# Patient Record
Sex: FEMALE | Race: WHITE | NOT HISPANIC OR LATINO | Employment: OTHER | URBAN - NONMETROPOLITAN AREA
[De-identification: names, ages, dates, MRNs, and addresses within clinical notes are randomized per-mention and may not be internally consistent; named-entity substitution may affect disease eponyms.]

---

## 2018-02-12 ENCOUNTER — HISTORICAL (OUTPATIENT)
Dept: ADMINISTRATIVE | Facility: HOSPITAL | Age: 73
End: 2018-02-12

## 2018-12-21 ENCOUNTER — HISTORICAL (OUTPATIENT)
Dept: ADMINISTRATIVE | Facility: HOSPITAL | Age: 73
End: 2018-12-21

## 2019-09-11 ENCOUNTER — HISTORICAL (OUTPATIENT)
Dept: ADMINISTRATIVE | Facility: HOSPITAL | Age: 74
End: 2019-09-11

## 2019-12-23 ENCOUNTER — HISTORICAL (OUTPATIENT)
Dept: ADMINISTRATIVE | Facility: HOSPITAL | Age: 74
End: 2019-12-23

## 2021-03-30 ENCOUNTER — HISTORICAL (OUTPATIENT)
Dept: ADMINISTRATIVE | Facility: HOSPITAL | Age: 76
End: 2021-03-30

## 2022-03-31 ENCOUNTER — HISTORICAL (OUTPATIENT)
Dept: ADMINISTRATIVE | Facility: HOSPITAL | Age: 77
End: 2022-03-31

## 2022-04-11 ENCOUNTER — HISTORICAL (OUTPATIENT)
Dept: ADMINISTRATIVE | Facility: HOSPITAL | Age: 77
End: 2022-04-11

## 2022-04-24 VITALS
SYSTOLIC BLOOD PRESSURE: 148 MMHG | WEIGHT: 145.31 LBS | BODY MASS INDEX: 28.53 KG/M2 | HEIGHT: 60 IN | DIASTOLIC BLOOD PRESSURE: 80 MMHG

## 2022-05-14 ENCOUNTER — HISTORICAL (OUTPATIENT)
Dept: ADMINISTRATIVE | Facility: HOSPITAL | Age: 77
End: 2022-05-14

## 2023-01-07 ENCOUNTER — HISTORICAL (OUTPATIENT)
Dept: ADMINISTRATIVE | Facility: HOSPITAL | Age: 78
End: 2023-01-07
Payer: MEDICARE

## 2023-03-16 DIAGNOSIS — Z12.31 BREAST CANCER SCREENING BY MAMMOGRAM: Primary | ICD-10-CM

## 2023-04-01 ENCOUNTER — HOSPITAL ENCOUNTER (EMERGENCY)
Facility: HOSPITAL | Age: 78
Discharge: HOME | End: 2023-04-01
Attending: FAMILY MEDICINE
Payer: MEDICARE

## 2023-04-01 VITALS
RESPIRATION RATE: 18 BRPM | DIASTOLIC BLOOD PRESSURE: 54 MMHG | SYSTOLIC BLOOD PRESSURE: 167 MMHG | BODY MASS INDEX: 25.52 KG/M2 | WEIGHT: 130 LBS | TEMPERATURE: 98 F | HEART RATE: 86 BPM | OXYGEN SATURATION: 98 % | HEIGHT: 60 IN

## 2023-04-01 DIAGNOSIS — T78.3XXA ANGIOEDEMA, INITIAL ENCOUNTER: ICD-10-CM

## 2023-04-01 DIAGNOSIS — T78.3XXA IDIOPATHIC ANGIOEDEMA, INITIAL ENCOUNTER: Primary | ICD-10-CM

## 2023-04-01 PROCEDURE — 96372 THER/PROPH/DIAG INJ SC/IM: CPT | Performed by: FAMILY MEDICINE

## 2023-04-01 PROCEDURE — 99284 EMERGENCY DEPT VISIT MOD MDM: CPT

## 2023-04-01 PROCEDURE — 63600175 PHARM REV CODE 636 W HCPCS: Performed by: FAMILY MEDICINE

## 2023-04-01 RX ORDER — METHYLPREDNISOLONE SOD SUCC 125 MG
125 VIAL (EA) INJECTION
Status: COMPLETED | OUTPATIENT
Start: 2023-04-01 | End: 2023-04-01

## 2023-04-01 RX ORDER — METHYLPREDNISOLONE 4 MG/1
TABLET ORAL
Qty: 21 EACH | Refills: 0 | Status: SHIPPED | OUTPATIENT
Start: 2023-04-01 | End: 2023-04-01 | Stop reason: SDUPTHER

## 2023-04-01 RX ORDER — METHYLPREDNISOLONE SOD SUCC 125 MG
125 VIAL (EA) INJECTION
Status: DISCONTINUED | OUTPATIENT
Start: 2023-04-01 | End: 2023-04-01

## 2023-04-01 RX ORDER — CETIRIZINE HYDROCHLORIDE 10 MG/1
10 TABLET ORAL DAILY
Qty: 30 TABLET | Refills: 0 | Status: SHIPPED | OUTPATIENT
Start: 2023-04-01 | End: 2023-05-01

## 2023-04-01 RX ORDER — CETIRIZINE HYDROCHLORIDE 10 MG/1
10 TABLET ORAL DAILY
Qty: 30 TABLET | Refills: 0 | Status: SHIPPED | OUTPATIENT
Start: 2023-04-01 | End: 2023-04-01 | Stop reason: SDUPTHER

## 2023-04-01 RX ORDER — METHYLPREDNISOLONE 4 MG/1
TABLET ORAL
Qty: 21 EACH | Refills: 0 | Status: SHIPPED | OUTPATIENT
Start: 2023-04-01 | End: 2023-04-22

## 2023-04-01 RX ADMIN — METHYLPREDNISOLONE SODIUM SUCCINATE 125 MG: 125 INJECTION, POWDER, FOR SOLUTION INTRAMUSCULAR; INTRAVENOUS at 08:04

## 2023-04-01 NOTE — ED PROVIDER NOTES
Encounter Date: 4/1/2023       History     Chief Complaint   Patient presents with    Nausea    Oral Swelling     Reports waking up at 0230 this morning with some swelling to tongue and nausea. Denies sob or diff. Breathing, also denies eating anything new or changes in meds.     Patient says that she has been having swelling of the tongue and which has gone down by itself I see a glossy tongue with no markers of infection or inflammation the patient is not on angiotensin receptor blockers the patient denies any unusual food she has consumed she denies any insect stings suspect allergic response versus idiopathic angioedema no facial muscle paralysis no tongue deviations language intact no motor or sensory deficits motor strength 5 x 5 in all 4 extremities normal gaze          Review of patient's allergies indicates:   Allergen Reactions    Keflex [cephalexin]     Sulfa (sulfonamide antibiotics)      Past Medical History:   Diagnosis Date    Diabetes mellitus     Hypertension      Past Surgical History:   Procedure Laterality Date    APPENDECTOMY      CHOLECYSTECTOMY      COLON SURGERY      HERNIA REPAIR       History reviewed. No pertinent family history.     Review of Systems   Constitutional:  Negative for fever.   HENT:  Negative for sore throat.         Tongue swelling   Eyes:  Negative for photophobia, pain, discharge, redness and itching.   Respiratory:  Negative for shortness of breath.    Cardiovascular:  Negative for chest pain.   Gastrointestinal:  Negative for nausea.   Endocrine: Negative for cold intolerance, heat intolerance and polydipsia.   Genitourinary:  Negative for decreased urine volume, difficulty urinating, dyspareunia, dysuria, enuresis, hematuria and pelvic pain.   Musculoskeletal:  Negative for back pain.   Skin:  Negative for rash.   Allergic/Immunologic: Negative for environmental allergies and food allergies.   Neurological:  Negative for dizziness, tremors, seizures, facial asymmetry,  speech difficulty, weakness, light-headedness, numbness and headaches.   Hematological:  Does not bruise/bleed easily.   Psychiatric/Behavioral:  Negative for agitation, behavioral problems and confusion.      Physical Exam     Initial Vitals [04/01/23 0750]   BP Pulse Resp Temp SpO2   (!) 167/54 86 18 97.7 °F (36.5 °C) 98 %      MAP       --         Physical Exam    Nursing note and vitals reviewed.  Constitutional: She appears well-developed.   HENT:   Tongue swelling which has gone down significantly on its own at the time of exam the texture of the tongue on the lateral aspect was a little rough but no obvious swelling   Eyes: Pupils are equal, round, and reactive to light.   Neck:   Normal range of motion.  Cardiovascular:  Normal rate, regular rhythm, normal heart sounds and intact distal pulses.           Pulmonary/Chest: Breath sounds normal.   Musculoskeletal:         General: Normal range of motion.      Cervical back: Normal range of motion.     Skin: Skin is warm.   Psychiatric: She has a normal mood and affect.       ED Course   Procedures  Labs Reviewed - No data to display       Imaging Results    None          Medications   methylPREDNISolone sodium succinate injection 125 mg (has no administration in time range)                              Clinical Impression:   Final diagnoses:  [T78.3XXA] Idiopathic angioedema, initial encounter (Primary)  [T78.3XXA] Angioedema, initial encounter        ED Disposition Condition    Discharge Stable          ED Prescriptions       Medication Sig Dispense Start Date End Date Auth. Provider    cetirizine (ZYRTEC) 10 MG tablet Take 1 tablet (10 mg total) by mouth once daily. 30 tablet 4/1/2023 5/1/2023 De Corley MD    methylPREDNISolone (MEDROL DOSEPACK) 4 mg tablet use as directed 21 each 4/1/2023 4/22/2023 De Corley MD          Follow-up Information       Follow up With Specialties Details Why Contact Info    lSava Carty III, MD Family Medicine In  1 day  [address] [place] [address]  [phone]               De Corley MD  04/01/23 0829       De Corley MD  04/01/23 0832

## 2023-04-03 ENCOUNTER — HOSPITAL ENCOUNTER (OUTPATIENT)
Dept: RADIOLOGY | Facility: HOSPITAL | Age: 78
Discharge: HOME | End: 2023-04-03
Attending: NURSE PRACTITIONER
Payer: MEDICARE

## 2023-04-03 VITALS — WEIGHT: 130 LBS | BODY MASS INDEX: 24.55 KG/M2 | HEIGHT: 61 IN

## 2023-04-03 DIAGNOSIS — Z12.31 BREAST CANCER SCREENING BY MAMMOGRAM: ICD-10-CM

## 2023-04-03 PROCEDURE — 77067 SCR MAMMO BI INCL CAD: CPT | Mod: TC

## 2023-04-18 ENCOUNTER — HOSPITAL ENCOUNTER (INPATIENT)
Facility: HOSPITAL | Age: 78
LOS: 2 days | Discharge: HOME | DRG: 392 | End: 2023-04-21
Attending: FAMILY MEDICINE | Admitting: INTERNAL MEDICINE
Payer: MEDICARE

## 2023-04-18 DIAGNOSIS — A41.9 SEVERE SEPSIS: Primary | ICD-10-CM

## 2023-04-18 DIAGNOSIS — R55 SYNCOPE: ICD-10-CM

## 2023-04-18 DIAGNOSIS — R05.9 COUGH: ICD-10-CM

## 2023-04-18 DIAGNOSIS — R65.20 SEVERE SEPSIS: Primary | ICD-10-CM

## 2023-04-18 LAB
ALBUMIN SERPL-MCNC: 3.7 G/DL (ref 3.4–5)
ALBUMIN/GLOB SERPL: 1.3 RATIO
ALP SERPL-CCNC: 103 UNIT/L (ref 50–144)
ALT SERPL-CCNC: 26 UNIT/L (ref 1–45)
ANION GAP SERPL CALC-SCNC: 10 MEQ/L (ref 2–13)
APPEARANCE UR: CLEAR
AST SERPL-CCNC: 35 UNIT/L (ref 14–36)
BACTERIA #/AREA URNS AUTO: NORMAL /HPF
BILIRUB UR QL STRIP.AUTO: ABNORMAL MG/DL
BILIRUBIN DIRECT+TOT PNL SERPL-MCNC: 0.8 MG/DL (ref 0–1)
BUN SERPL-MCNC: 12 MG/DL (ref 7–20)
CALCIUM SERPL-MCNC: 9.3 MG/DL (ref 8.4–10.2)
CHLORIDE SERPL-SCNC: 97 MMOL/L (ref 98–110)
CK SERPL-CCNC: 45 U/L (ref 30–135)
CO2 SERPL-SCNC: 19 MMOL/L (ref 21–32)
COLOR UR AUTO: YELLOW
CREAT SERPL-MCNC: 0.83 MG/DL (ref 0.66–1.25)
CREAT/UREA NIT SERPL: 14 (ref 12–20)
ERYTHROCYTE [DISTWIDTH] IN BLOOD BY AUTOMATED COUNT: 13.2 % (ref 11–14.5)
GFR SERPLBLD CREATININE-BSD FMLA CKD-EPI: 72 MLS/MIN/1.73/M2
GLOBULIN SER-MCNC: 2.8 GM/DL (ref 2–3.9)
GLUCOSE SERPL-MCNC: 231 MG/DL (ref 70–115)
GLUCOSE UR QL STRIP.AUTO: 500 MG/DL
HCT VFR BLD AUTO: 41.3 % (ref 36–48)
HGB BLD-MCNC: 13.8 G/DL (ref 11.8–16)
IMM GRANULOCYTES # BLD AUTO: 0.37 X10(3)/MCL (ref 0–0.03)
IMM GRANULOCYTES NFR BLD AUTO: 1.3 % (ref 0–0.5)
INFLUENZA A (OHS): NEGATIVE
INFLUENZA B (OHS): NEGATIVE
KETONES UR QL STRIP.AUTO: 15 MG/DL
LACTATE SERPL-SCNC: 3.9 MMOL/L (ref 0.4–2)
LEUKOCYTE ESTERASE UR QL STRIP.AUTO: NEGATIVE UNIT/L
MAGNESIUM SERPL-MCNC: 1.8 MG/DL (ref 1.8–2.4)
MCH RBC QN AUTO: 27.7 PG (ref 27–34)
MCV RBC AUTO: 82.9 FL (ref 79–99)
MEAN CELL HEMOGLOBIN CONCENTRATION (OHS) G/DL: 33.4 G/DL (ref 31–37)
NITRITE UR QL STRIP.AUTO: NEGATIVE
NRBC BLD AUTO-RTO: 0 % (ref 0–1)
PH UR STRIP.AUTO: 6.5 [PH]
PLATELET # BLD AUTO: 228 X10(3)/MCL (ref 140–371)
PMV BLD AUTO: 8.3 FL (ref 9.4–12.4)
POTASSIUM SERPL-SCNC: 3.3 MMOL/L (ref 3.5–5.1)
PROT SERPL-MCNC: 6.5 GM/DL (ref 6.3–8.2)
PROT UR QL STRIP.AUTO: >=300 MG/DL
RBC # BLD AUTO: 4.98 X10(6)/MCL (ref 4–5.1)
RBC #/AREA URNS AUTO: NORMAL /HPF
RBC UR QL AUTO: NEGATIVE UNIT/L
SODIUM SERPL-SCNC: 126 MMOL/L (ref 135–145)
SP GR UR STRIP.AUTO: 1.02
SQUAMOUS #/AREA URNS AUTO: NORMAL /HPF
TROPONIN I SERPL-MCNC: 0.02 NG/ML (ref 0–0.03)
UROBILINOGEN UR STRIP-ACNC: 2 MG/DL
WBC # SPEC AUTO: 27.6 X10(3)/MCL (ref 4–11.5)
WBC #/AREA URNS AUTO: NORMAL /HPF

## 2023-04-18 PROCEDURE — 93005 ELECTROCARDIOGRAM TRACING: CPT

## 2023-04-18 PROCEDURE — 87400 INFLUENZA A/B EACH AG IA: CPT | Performed by: FAMILY MEDICINE

## 2023-04-18 PROCEDURE — 96375 TX/PRO/DX INJ NEW DRUG ADDON: CPT

## 2023-04-18 PROCEDURE — 63600175 PHARM REV CODE 636 W HCPCS: Performed by: FAMILY MEDICINE

## 2023-04-18 PROCEDURE — 85027 COMPLETE CBC AUTOMATED: CPT | Performed by: FAMILY MEDICINE

## 2023-04-18 PROCEDURE — 82550 ASSAY OF CK (CPK): CPT | Performed by: FAMILY MEDICINE

## 2023-04-18 PROCEDURE — 99285 EMERGENCY DEPT VISIT HI MDM: CPT | Mod: 25

## 2023-04-18 PROCEDURE — 83605 ASSAY OF LACTIC ACID: CPT | Performed by: FAMILY MEDICINE

## 2023-04-18 PROCEDURE — 80053 COMPREHEN METABOLIC PANEL: CPT | Performed by: FAMILY MEDICINE

## 2023-04-18 PROCEDURE — 93010 ELECTROCARDIOGRAM REPORT: CPT | Mod: ,,, | Performed by: INTERNAL MEDICINE

## 2023-04-18 PROCEDURE — 93010 EKG 12-LEAD: ICD-10-PCS | Mod: ,,, | Performed by: INTERNAL MEDICINE

## 2023-04-18 PROCEDURE — 36415 COLL VENOUS BLD VENIPUNCTURE: CPT | Performed by: FAMILY MEDICINE

## 2023-04-18 PROCEDURE — 81001 URINALYSIS AUTO W/SCOPE: CPT | Performed by: FAMILY MEDICINE

## 2023-04-18 PROCEDURE — 83735 ASSAY OF MAGNESIUM: CPT | Performed by: FAMILY MEDICINE

## 2023-04-18 PROCEDURE — 84484 ASSAY OF TROPONIN QUANT: CPT | Performed by: FAMILY MEDICINE

## 2023-04-18 RX ORDER — ONDANSETRON 2 MG/ML
8 INJECTION INTRAMUSCULAR; INTRAVENOUS
Status: COMPLETED | OUTPATIENT
Start: 2023-04-18 | End: 2023-04-18

## 2023-04-18 RX ORDER — PROCHLORPERAZINE EDISYLATE 5 MG/ML
5 INJECTION INTRAMUSCULAR; INTRAVENOUS
Status: COMPLETED | OUTPATIENT
Start: 2023-04-18 | End: 2023-04-18

## 2023-04-18 RX ADMIN — PROCHLORPERAZINE EDISYLATE 5 MG: 5 INJECTION INTRAMUSCULAR; INTRAVENOUS at 10:04

## 2023-04-18 RX ADMIN — ONDANSETRON 8 MG: 2 INJECTION INTRAMUSCULAR; INTRAVENOUS at 10:04

## 2023-04-18 NOTE — Clinical Note
Diagnosis: Severe sepsis [939968]   Future Attending Provider: NALLELY NEFF [[id number]]   Admitting Provider:: NALLELY NEFF [[id number]]

## 2023-04-19 LAB
ABS NEUT CALC (OHS): 19.38 X10(3)/MCL (ref 2.1–9.2)
ABS NEUT CALC (OHS): 24.01 X10(3)/MCL (ref 2.1–9.2)
ALBUMIN SERPL-MCNC: 3.1 G/DL (ref 3.4–5)
ALBUMIN/GLOB SERPL: 1.3 RATIO
ALP SERPL-CCNC: 98 UNIT/L (ref 50–144)
ALT SERPL-CCNC: 19 UNIT/L (ref 1–45)
ANION GAP SERPL CALC-SCNC: 7 MEQ/L (ref 2–13)
AST SERPL-CCNC: 26 UNIT/L (ref 14–36)
BILIRUBIN DIRECT+TOT PNL SERPL-MCNC: 1 MG/DL (ref 0–1)
BUN SERPL-MCNC: 11 MG/DL (ref 7–20)
CALCIUM SERPL-MCNC: 7.9 MG/DL (ref 8.4–10.2)
CHLORIDE SERPL-SCNC: 96 MMOL/L (ref 98–110)
CLOSTRIDIUM DIFFICILE GDH ANTIGEN (OHS): NEGATIVE
CLOSTRIDIUM DIFFICILE TOXIN A/B (OHS): NEGATIVE
CO2 SERPL-SCNC: 24 MMOL/L (ref 21–32)
CREAT SERPL-MCNC: 0.73 MG/DL (ref 0.66–1.25)
CREAT/UREA NIT SERPL: 15 (ref 12–20)
ERYTHROCYTE [DISTWIDTH] IN BLOOD BY AUTOMATED COUNT: 13.1 % (ref 11–14.5)
GFR SERPLBLD CREATININE-BSD FMLA CKD-EPI: 84 MLS/MIN/1.73/M2
GLOBULIN SER-MCNC: 2.4 GM/DL (ref 2–3.9)
GLUCOSE SERPL-MCNC: 216 MG/DL (ref 70–115)
HCT VFR BLD AUTO: 35.1 % (ref 36–48)
HEMOCCULT SP1 STL QL: POSITIVE
HGB BLD-MCNC: 11.9 G/DL (ref 11.8–16)
IMM GRANULOCYTES # BLD AUTO: 0.14 X10(3)/MCL (ref 0–0.03)
IMM GRANULOCYTES NFR BLD AUTO: 0.7 % (ref 0–0.5)
LACTATE SERPL-SCNC: 1.8 MMOL/L (ref 0.4–2)
LACTATE SERPL-SCNC: 2.9 MMOL/L (ref 0.4–2)
LYMPHOCYTES NFR BLD MANUAL: 0.2 X10(3)/MCL
LYMPHOCYTES NFR BLD MANUAL: 1 % (ref 13–40)
LYMPHOCYTES NFR BLD MANUAL: 1.93 X10(3)/MCL
LYMPHOCYTES NFR BLD MANUAL: 7 % (ref 13–40)
MCH RBC QN AUTO: 27.7 PG (ref 27–34)
MCV RBC AUTO: 81.6 FL (ref 79–99)
MEAN CELL HEMOGLOBIN CONCENTRATION (OHS) G/DL: 33.9 G/DL (ref 31–37)
METAMYELOCYTES NFR BLD MANUAL: 1 %
MONOCYTES NFR BLD MANUAL: 0.82 X10(3)/MCL (ref 0.1–1.3)
MONOCYTES NFR BLD MANUAL: 1.66 X10(3)/MCL (ref 0.1–1.3)
MONOCYTES NFR BLD MANUAL: 4 % (ref 2–11)
MONOCYTES NFR BLD MANUAL: 6 % (ref 2–11)
NEUTROPHILS NFR BLD MANUAL: 86 % (ref 47–80)
NEUTROPHILS NFR BLD MANUAL: 95 % (ref 47–80)
NRBC BLD AUTO-RTO: 0 % (ref 0–1)
PLATELET # BLD AUTO: 203 X10(3)/MCL (ref 140–371)
PLATELET # BLD EST: ADEQUATE 10*3/UL
PLATELET # BLD EST: NORMAL 10*3/UL
PMV BLD AUTO: 8.6 FL (ref 9.4–12.4)
POCT GLUCOSE: 140 MG/DL (ref 70–110)
POCT GLUCOSE: 200 MG/DL (ref 70–110)
POCT GLUCOSE: 78 MG/DL (ref 70–110)
POTASSIUM SERPL-SCNC: 3.8 MMOL/L (ref 3.5–5.1)
PROT SERPL-MCNC: 5.5 GM/DL (ref 6.3–8.2)
RBC # BLD AUTO: 4.3 X10(6)/MCL (ref 4–5.1)
RBC MORPH BLD: NORMAL
SARS-COV-2 RDRP RESP QL NAA+PROBE: NEGATIVE
SODIUM SERPL-SCNC: 127 MMOL/L (ref 135–145)
TSH SERPL-ACNC: 0.62 UIU/ML (ref 0.36–3.74)
WBC # SPEC AUTO: 20.4 X10(3)/MCL (ref 4–11.5)

## 2023-04-19 PROCEDURE — 63600175 PHARM REV CODE 636 W HCPCS: Performed by: FAMILY MEDICINE

## 2023-04-19 PROCEDURE — 83605 ASSAY OF LACTIC ACID: CPT | Performed by: FAMILY MEDICINE

## 2023-04-19 PROCEDURE — 21400001 HC TELEMETRY ROOM

## 2023-04-19 PROCEDURE — 51702 INSERT TEMP BLADDER CATH: CPT

## 2023-04-19 PROCEDURE — 25000003 PHARM REV CODE 250: Performed by: FAMILY MEDICINE

## 2023-04-19 PROCEDURE — 85027 COMPLETE CBC AUTOMATED: CPT | Performed by: FAMILY MEDICINE

## 2023-04-19 PROCEDURE — 82270 OCCULT BLOOD FECES: CPT | Performed by: FAMILY MEDICINE

## 2023-04-19 PROCEDURE — 87635 SARS-COV-2 COVID-19 AMP PRB: CPT | Performed by: INTERNAL MEDICINE

## 2023-04-19 PROCEDURE — 25000003 PHARM REV CODE 250: Performed by: INTERNAL MEDICINE

## 2023-04-19 PROCEDURE — 97161 PT EVAL LOW COMPLEX 20 MIN: CPT

## 2023-04-19 PROCEDURE — 84443 ASSAY THYROID STIM HORMONE: CPT | Performed by: INTERNAL MEDICINE

## 2023-04-19 PROCEDURE — 97116 GAIT TRAINING THERAPY: CPT

## 2023-04-19 PROCEDURE — 96365 THER/PROPH/DIAG IV INF INIT: CPT

## 2023-04-19 PROCEDURE — 11000001 HC ACUTE MED/SURG PRIVATE ROOM

## 2023-04-19 PROCEDURE — 63600175 PHARM REV CODE 636 W HCPCS: Performed by: INTERNAL MEDICINE

## 2023-04-19 PROCEDURE — 36415 COLL VENOUS BLD VENIPUNCTURE: CPT | Performed by: FAMILY MEDICINE

## 2023-04-19 PROCEDURE — S0030 INJECTION, METRONIDAZOLE: HCPCS | Performed by: FAMILY MEDICINE

## 2023-04-19 PROCEDURE — 80053 COMPREHEN METABOLIC PANEL: CPT | Performed by: FAMILY MEDICINE

## 2023-04-19 PROCEDURE — 83605 ASSAY OF LACTIC ACID: CPT | Mod: 91 | Performed by: FAMILY MEDICINE

## 2023-04-19 PROCEDURE — 94761 N-INVAS EAR/PLS OXIMETRY MLT: CPT

## 2023-04-19 PROCEDURE — 86318 IA INFECTIOUS AGENT ANTIBODY: CPT | Performed by: INTERNAL MEDICINE

## 2023-04-19 RX ORDER — MUPIROCIN 20 MG/G
OINTMENT TOPICAL 2 TIMES DAILY
Status: DISCONTINUED | OUTPATIENT
Start: 2023-04-19 | End: 2023-04-21 | Stop reason: HOSPADM

## 2023-04-19 RX ORDER — DEXTROSE 40 %
30 GEL (GRAM) ORAL
Status: DISCONTINUED | OUTPATIENT
Start: 2023-04-19 | End: 2023-04-21 | Stop reason: HOSPADM

## 2023-04-19 RX ORDER — IBUPROFEN 400 MG/1
400 TABLET ORAL EVERY 6 HOURS PRN
Status: DISCONTINUED | OUTPATIENT
Start: 2023-04-19 | End: 2023-04-21 | Stop reason: HOSPADM

## 2023-04-19 RX ORDER — GLIMEPIRIDE 4 MG/1
4 TABLET ORAL 2 TIMES DAILY
COMMUNITY
Start: 2023-03-14

## 2023-04-19 RX ORDER — GLUCAGON 1 MG
1 KIT INJECTION
Status: DISCONTINUED | OUTPATIENT
Start: 2023-04-19 | End: 2023-04-21 | Stop reason: HOSPADM

## 2023-04-19 RX ORDER — SODIUM CHLORIDE 0.9 % (FLUSH) 0.9 %
3 SYRINGE (ML) INJECTION EVERY 6 HOURS PRN
Status: DISCONTINUED | OUTPATIENT
Start: 2023-04-19 | End: 2023-04-21 | Stop reason: HOSPADM

## 2023-04-19 RX ORDER — IBUPROFEN 200 MG
16 TABLET ORAL
Status: DISCONTINUED | OUTPATIENT
Start: 2023-04-19 | End: 2023-04-21 | Stop reason: HOSPADM

## 2023-04-19 RX ORDER — DEXTROSE MONOHYDRATE, SODIUM CHLORIDE, AND POTASSIUM CHLORIDE 50; 1.49; 9 G/1000ML; G/1000ML; G/1000ML
INJECTION, SOLUTION INTRAVENOUS CONTINUOUS
Status: DISCONTINUED | OUTPATIENT
Start: 2023-04-19 | End: 2023-04-19

## 2023-04-19 RX ORDER — LORAZEPAM 1 MG/1
1 TABLET ORAL EVERY 8 HOURS PRN
COMMUNITY
Start: 2022-06-17

## 2023-04-19 RX ORDER — METFORMIN HYDROCHLORIDE 500 MG/1
TABLET, EXTENDED RELEASE ORAL
COMMUNITY
Start: 2023-03-14

## 2023-04-19 RX ORDER — DEXTROSE 40 %
15 GEL (GRAM) ORAL
Status: DISCONTINUED | OUTPATIENT
Start: 2023-04-19 | End: 2023-04-21 | Stop reason: HOSPADM

## 2023-04-19 RX ORDER — METOCLOPRAMIDE HYDROCHLORIDE 5 MG/ML
10 INJECTION INTRAMUSCULAR; INTRAVENOUS ONCE
Status: COMPLETED | OUTPATIENT
Start: 2023-04-19 | End: 2023-04-19

## 2023-04-19 RX ORDER — LEVOFLOXACIN 5 MG/ML
500 INJECTION, SOLUTION INTRAVENOUS
Status: DISCONTINUED | OUTPATIENT
Start: 2023-04-19 | End: 2023-04-21 | Stop reason: HOSPADM

## 2023-04-19 RX ORDER — HYDROCHLOROTHIAZIDE 12.5 MG/1
12.5 CAPSULE ORAL
COMMUNITY
Start: 2023-04-03

## 2023-04-19 RX ORDER — GLUCAGON 1 MG
1 KIT INJECTION
Status: DISCONTINUED | OUTPATIENT
Start: 2023-04-19 | End: 2023-04-19 | Stop reason: SDUPTHER

## 2023-04-19 RX ORDER — ONDANSETRON 2 MG/ML
4 INJECTION INTRAMUSCULAR; INTRAVENOUS EVERY 6 HOURS PRN
Status: DISCONTINUED | OUTPATIENT
Start: 2023-04-19 | End: 2023-04-21 | Stop reason: HOSPADM

## 2023-04-19 RX ORDER — ENOXAPARIN SODIUM 100 MG/ML
40 INJECTION SUBCUTANEOUS EVERY 24 HOURS
Status: DISCONTINUED | OUTPATIENT
Start: 2023-04-19 | End: 2023-04-21 | Stop reason: HOSPADM

## 2023-04-19 RX ORDER — LORAZEPAM 1 MG/1
1 TABLET ORAL EVERY 8 HOURS PRN
Status: DISCONTINUED | OUTPATIENT
Start: 2023-04-19 | End: 2023-04-21 | Stop reason: HOSPADM

## 2023-04-19 RX ORDER — IBUPROFEN 200 MG
24 TABLET ORAL
Status: DISCONTINUED | OUTPATIENT
Start: 2023-04-19 | End: 2023-04-21 | Stop reason: HOSPADM

## 2023-04-19 RX ORDER — AMLODIPINE BESYLATE 10 MG/1
10 TABLET ORAL
COMMUNITY
Start: 2023-04-03

## 2023-04-19 RX ORDER — INSULIN ASPART 100 [IU]/ML
1-10 INJECTION, SOLUTION INTRAVENOUS; SUBCUTANEOUS
Status: DISCONTINUED | OUTPATIENT
Start: 2023-04-19 | End: 2023-04-21 | Stop reason: HOSPADM

## 2023-04-19 RX ORDER — ACETAMINOPHEN 325 MG/1
650 TABLET ORAL EVERY 6 HOURS PRN
Status: DISCONTINUED | OUTPATIENT
Start: 2023-04-19 | End: 2023-04-21 | Stop reason: HOSPADM

## 2023-04-19 RX ORDER — INDOMETHACIN 25 MG/1
50 CAPSULE ORAL ONCE
Status: COMPLETED | OUTPATIENT
Start: 2023-04-19 | End: 2023-04-19

## 2023-04-19 RX ORDER — METRONIDAZOLE 500 MG/100ML
500 INJECTION, SOLUTION INTRAVENOUS
Status: DISCONTINUED | OUTPATIENT
Start: 2023-04-19 | End: 2023-04-21 | Stop reason: HOSPADM

## 2023-04-19 RX ORDER — SODIUM CHLORIDE AND POTASSIUM CHLORIDE 150; 900 MG/100ML; MG/100ML
INJECTION, SOLUTION INTRAVENOUS CONTINUOUS
Status: DISCONTINUED | OUTPATIENT
Start: 2023-04-19 | End: 2023-04-21 | Stop reason: HOSPADM

## 2023-04-19 RX ADMIN — SODIUM CHLORIDE AND POTASSIUM CHLORIDE: 150; 900 INJECTION, SOLUTION INTRAVENOUS at 05:04

## 2023-04-19 RX ADMIN — DEXTROSE, SODIUM CHLORIDE, AND POTASSIUM CHLORIDE: 5; .9; .15 INJECTION INTRAVENOUS at 02:04

## 2023-04-19 RX ADMIN — METRONIDAZOLE 500 MG: 5 INJECTION, SOLUTION INTRAVENOUS at 11:04

## 2023-04-19 RX ADMIN — METRONIDAZOLE 500 MG: 5 INJECTION, SOLUTION INTRAVENOUS at 05:04

## 2023-04-19 RX ADMIN — METRONIDAZOLE 500 MG: 5 INJECTION, SOLUTION INTRAVENOUS at 03:04

## 2023-04-19 RX ADMIN — ENOXAPARIN SODIUM 40 MG: 100 INJECTION SUBCUTANEOUS at 04:04

## 2023-04-19 RX ADMIN — SODIUM BICARBONATE 50 MEQ: 84 INJECTION, SOLUTION INTRAVENOUS at 05:04

## 2023-04-19 RX ADMIN — METOCLOPRAMIDE 10 MG: 5 INJECTION, SOLUTION INTRAMUSCULAR; INTRAVENOUS at 05:04

## 2023-04-19 RX ADMIN — PIPERACILLIN AND TAZOBACTAM 4.5 G: 4; .5 INJECTION, POWDER, FOR SOLUTION INTRAVENOUS; PARENTERAL at 12:04

## 2023-04-19 RX ADMIN — LEVOFLOXACIN 500 MG: 500 INJECTION, SOLUTION INTRAVENOUS at 03:04

## 2023-04-19 RX ADMIN — SODIUM CHLORIDE 1000 ML: 9 INJECTION, SOLUTION INTRAVENOUS at 01:04

## 2023-04-19 NOTE — PLAN OF CARE
Problem: Adult Inpatient Plan of Care  Goal: Plan of Care Review  4/19/2023 0302 by Deborah Posey RN  Outcome: Ongoing, Not Progressing  4/19/2023 0301 by Deborah Posey RN  Flowsheets (Taken 4/19/2023 0301)  Plan of Care Reviewed With: patient  Goal: Patient-Specific Goal (Individualized)  Outcome: Ongoing, Not Progressing  Goal: Absence of Hospital-Acquired Illness or Injury  Outcome: Ongoing, Not Progressing  Goal: Optimal Comfort and Wellbeing  Outcome: Ongoing, Not Progressing  Goal: Readiness for Transition of Care  Outcome: Ongoing, Not Progressing     Problem: Infection  Goal: Absence of Infection Signs and Symptoms  Outcome: Ongoing, Not Progressing     Problem: Hyperglycemia  Goal: Blood Glucose Level Within Targeted Range  Outcome: Ongoing, Not Progressing

## 2023-04-19 NOTE — PLAN OF CARE
04/19/23 0927   Discharge Assessment   Assessment Type Discharge Planning Assessment   Confirmed/corrected address, phone number and insurance Yes   Confirmed Demographics Correct on Facesheet   Source of Information patient   When was your last doctors appointment?   (last year, has appointment next week)   Communicated ULISES with patient/caregiver Date not available/Unable to determine   Reason For Admission weakness, fall   People in Home spouse   Facility Arrived From: home   Do you expect to return to your current living situation? Yes   Do you have help at home or someone to help you manage your care at home? Yes   Who are your caregiver(s) and their phone number(s)?  Dima Strickland [phone]   Prior to hospitilization cognitive status: Alert/Oriented   Current cognitive status: Alert/Oriented   Home Accessibility wheelchair accessible   Home Layout Able to live on 1st floor   Equipment Currently Used at Home none   Readmission within 30 days? No   Patient currently being followed by outpatient case management? No   Do you currently have service(s) that help you manage your care at home? No   Do you take prescription medications? Yes   Do you have prescription coverage? Yes   Coverage MCR   Do you have any problems affording any of your prescribed medications? No   Is the patient taking medications as prescribed? yes   Who is going to help you get home at discharge?    How do you get to doctors appointments? car, drives self;family or friend will provide   Are you on dialysis? No   Do you take coumadin? No   Discharge Plan A Home with family   Discharge Plan B Home Health   DME Needed Upon Discharge  none   Discharge Plan discussed with: Patient;Spouse/sig other   Name(s) and Number(s) Dima Strickland [phone]   Discharge Barriers Identified None   Physical Activity   On average, how many days per week do you engage in moderate to strenuous exercise (like a brisk walk)? 3 days   On  average, how many minutes do you engage in exercise at this level? 30 min   Financial Resource Strain   How hard is it for you to pay for the very basics like food, housing, medical care, and heating? Not very   Housing Stability   In the last 12 months, was there a time when you were not able to pay the mortgage or rent on time? N   In the last 12 months, how many places have you lived? 1   In the last 12 months, was there a time when you did not have a steady place to sleep or slept in a shelter (including now)? N   Transportation Needs   In the past 12 months, has lack of transportation kept you from medical appointments or from getting medications? no   In the past 12 months, has lack of transportation kept you from meetings, work, or from getting things needed for daily living? No   Food Insecurity   Within the past 12 months, you worried that your food would run out before you got the money to buy more. Never true   Within the past 12 months, the food you bought just didn't last and you didn't have money to get more. Never true   Stress   Do you feel stress - tense, restless, nervous, or anxious, or unable to sleep at night because your mind is troubled all the time - these days? Not at all   Social Connections   In a typical week, how many times do you talk on the phone with family, friends, or neighbors? Three   How often do you get together with friends or relatives? Three times   How often do you attend Confucianism or Sabianist services? More than 4   Do you belong to any clubs or organizations such as Confucianism groups, unions, fraternal or athletic groups, or school groups? No   How often do you attend meetings of the clubs or organizations you belong to? Never   Are you , , , , never , or living with a partner?    Alcohol Use   Q1: How often do you have a drink containing alcohol? Never   Q2: How many drinks containing alcohol do you have on a typical day when you are  drinking? None   Q3: How often do you have six or more drinks on one occasion? Never

## 2023-04-19 NOTE — PROGRESS NOTES
Hospital Medicine  Progress Note    Patient Name: Laureen Strickland  MRN: [id number]  Status: IP- Inpatient   Admission Date: 4/18/2023  Length of Stay: 0      CC: hospital follow-up for        SUBJECTIVE    77 y.o. female with a medical history of hypertension and type 2 diabetes mellitus presents to the ER on account of nausea, vomiting and diarrhea since yesterday.    Patient has been at her usual state of health until yesterday when she developed episodes of nausea, nonbloody vomiting and nonbloody and diarrhea.  There has been associated diffuse nonspecific abdominal pain, pain was about 5/10 in intensity, nonradiating with no aggravating or relieving factor.  There has been associated generalized body weakness, and she had episode of syncope while she was walking after coming out of the bathroom.  She presented to the ER for further evaluation.    At the ER, CT of the abdomen was done and shows evidence of gastroenteritis.  Lactic acid was also elevated.    04/19/2023 feel better this AM   Wbc trend down to 20  Lactic down 1.8    Today: Patient seen and examined at bedside, and chart reviewed.       MEDICATIONS   Scheduled   enoxaparin  40 mg Subcutaneous Daily    levoFLOXacin  500 mg Intravenous Q24H    metronidazole  500 mg Intravenous Q8H    mupirocin   Nasal BID     Continuous Infusions   0/9% NACL & POTASSIUM CHLORIDE 20 MEQ/L 75 mL/hr at 04/19/23 0518         PHYSICAL EXAM   VITALS: T 98.2 °F (36.8 °C)   BP (!) 132/56   P 83   RR 18   O2 95 %    GENERAL: Awake and in NAD  LUNGS: CTA B/L  CVS: Normal rate  GI/: Soft, NT, bowel sounds positive.  EXTREMITIES: No peripheral edema  NEURO: AAOx3  PSYCH: Cooperative      LABS   CBC  Recent Labs     04/18/23  2223 04/19/23  0430   WBC 27.6* 20.4*   RBC 4.98 4.30   HGB 13.8 11.9   HCT 41.3 35.1*   MCV 82.9 81.6   MCH 27.7 27.7   MCHC 33.4 33.9   RDW 13.2 13.1    203     CHEM  Recent Labs     04/18/23  2305 04/19/23  0430   * 127*   K 3.3* 3.8    CHLORIDE 97* 96*   CO2 19* 24   BUN 12.0 11.0   CREATININE 0.83 0.73   GLUCOSE 231* 216*   CALCIUM 9.3 7.9*   MG 1.80  --    ALBUMIN 3.7 3.1*   GLOBULIN 2.8 2.4   ALKPHOS 103 98   ALT 26 19   AST 35 26   BILITOT 0.8 1.0   TSH  --  0.617         MICROBIOLOGY     Microbiology Results (last 7 days)       Procedure Component Value Units Date/Time    C Diff Toxin by PCR [[id number]] Collected: 04/19/23 0930    Order Status: Sent Specimen: Stool Updated: 04/19/23 0936    Rapid Influenza A/B [[id number]]  (Normal) Collected: 04/18/23 2254    Order Status: Completed Specimen: Nasopharyngeal from Nasal Updated: 04/18/23 2340     Influenza A Negative     Influenza B Negative              DIAGNOSTICS   CT Head Without Contrast  Narrative: EXAMINATION:  STUDY: CT HEAD WITHOUT CONTRAST    CLINICAL HISTORY AND TECHNIQUE:  Efren Ivey, RT on 4/19/2023  6:31 AM    ER PT    PROCEDURE: CT BRAIN WO    CLINICAL HX: X SEVERAL HRS  LT SIDED WEAKNESS   FACIAL DROOPING  SLURRED SPEECH    PMH:SMOKER  HTN  NEUROMAOR SCHWANNOMA    CV    TECHNIQUE:    IV CONTRAST:    ORAL CONTRAST: N/A    RECTAL CONTRAST: N/A    AXIAL IMAGING @ 5 MM INTERVALS W/MULTIPLANAR RECONSTRUCTION OF IMAGES    -TOTAL IMAGE NUMBER: 35    -CTDIVOL(MGY) HEAD:104.60   BODY:    -DLP (MGYCM) HEAD:1984.40      BODY:    TECH: RW    This patient has had 4 CT and nuclear medicine scans performed within the last 12 months.    The following DOSE REDUCTION TECHNIQUES are used for all CT scans at [facility]:    1. Automated exposure control.  2. Adjustment of the mA and/or kv according to patient size.  3. Use of iterative reconstruction technique.    COMPARISON:  04/18/2023    FINDINGS:  Axial imaging through the head/brain was performed. Minimal cerebral atrophy accentuates the ventricles and sulci and is most pronounced within the frontal parietal lobes.  Minimal chronic ischemic changes are noted within the deep white matter of both cerebral hemispheres and  does not appear excessive for the patient's age.  A left retroauricular electronic device is present as seen on previous imaging.  I see no intraparenchymal masses, hemorrhagic lesions, or dominant wedge-shaped infarcts. I see no extra-axial masses or abnormal fluid collections.  Impression: 1. Chronic changes are present as described above and as seen previously including minimal cerebral atrophy and chronic ischemic changes which do not appear excessive for the patient's age.  See above comments.    Electronically signed by: Jadiel Verduzco  Date:    04/19/2023  Time:    08:40  X-Ray Chest AP Portable  Narrative: EXAMINATION:  STUDY: XR CHEST AP PORTABLE    CLINICAL HISTORY AND TECHNIQUE:  Efren Ivey, RT on 4/19/2023 12:09 AM    CURRENT CLINICAL HISTORY: ER PT    X 2 HRS PTA  WEAKNESS   N\V\D    PMH:  CV+, MODESTA, HTN, DM, COLON TUMOR, HIATAL HERNIA, GERD, FORMER SMOKER    TECHNIQUE: 1V PCXR    TECHNOLOGIST: SAMANTHA HOLMAN    COMPARISON:  11/13/2017    FINDINGS:  The cardiac, hilar, and mediastinal contours appear unremarkable.I see no lobar or segmental infiltrates.No significant pleural effusions are noted.There is moderate demineralization of the skeletal structures with the mild kyphoscoliotic curvature of the thoracic spine and mild degenerative changes noted throughout the thoracic spine.  Atherosclerotic calcifications are noted within the aortic arch.  Impression: 1. I see no lobar or segmental infiltrates or other significant abnormalities.    Electronically signed by: Jadiel Verduzco  Date:    04/19/2023  Time:    05:05  CT Abdomen Pelvis  Without Contrast  Narrative: STUDY:CT SCAN OF THE ABDOMEN AND PELVIS WITH INTRAVENOUS CONTRAST    CLINICAL HISTORY & TECHNIQUE:    X 2 HRS PTA  WEAKNESS  FALL   N \V\D    PMH: VERTIGO  HTN  DM  FORMER SMOKER  COLON TUMOR WITH SX  BOWEL OBSTRUCTION  MODESTA    COMPARISON:  01/07/2016    FINDINGS:    Liver:  No clinically significant abnormalities  noted.    Gallbladder/Biliary System:  Compatible with a previous cholecystectomy.    Spleen:  No clinically significant abnormalities noted.    Adrenal glands:  No clinically significant abnormalities noted.    Pancreas:  No clinically significant abnormalities noted.    Kidneys/Urinary Tract:  No clinically significant abnormalities noted.    Urinary bladder:  No clinically significant abnormalities noted.    Prostate gland/uterus and ovaries:  No clinically significant abnormalities noted.    GI tract:  Fluid is present in multiple loops of bowel, large and small.  Postsurgical findings are also present.    Vascular structures:  Fairly extensive atherosclerotic calcification is present involving the aorta and its major branches.    Musculoskeletal structures:  Moderate bony demineralization is present with moderate degenerative findings noted throughout the bony elements.    Miscellaneous:  No clinically significant abnormalities noted.  Impression: 1. Fluid is present in multiple loops of large and small bowel, likely on the basis of gastroenteritis.  2. Postsurgical and chronic findings as described above.  n/a    CATEGORY: n/a    The following dose reduction techniques are used for all CT at [facility]:    1.   Automated exposure control.    2.   Adjustment of the mA and/or kV according to patient size.    3.   Use of iterative reconstruction technique.    Electronically signed by: Philippe Ya  Date:    04/19/2023  Time:    00:24  CT Head Without Contrast  Narrative: EXAMINATION:  CT HEAD WITHOUT CONTRAST    CLINICAL HISTORY:  syncope;    TECHNIQUE:  Low dose axial images were obtained through the head.  Coronal and sagittal reformations were also performed. Contrast was not administered.    COMPARISON:  05/14/2022    FINDINGS:  One [id number]-[id number]-rbat comparison is made to the prior examination, allowing for differences in positioning and technique, no significant or suspicious interval  change is appreciated.  Again noted is mild generalized atrophy with mild areas of decreased attenuation involving the periventricular deep white matter on both sides compatible with mild chronic ischemic disease.  A previously seen metallic devices again noted producing some artifact along the left side of the bony calvarium.  No intracranial mass, midline shift or intracranial hemorrhage or evidence of dominant wedge-shaped infarct is identified.  Impression: 1.   Stable chronic findings having a similar appearance to the prior study of of 05/14/2022.    Electronically signed by: Philippe Ya  Date:    04/19/2023  Time:    00:13        ASSESSMENT/PLAN:   1. Severe acute gastroenteritis; rule out infectious induced, place patient on IV fluids, empiric IV Levaquin and Flagyl, obtain stool workup.  Obtain COVID-19 testing.  Rule out C diff. CT of the abdomen shows evidence of acute gastroenteritis.  Feel better today  Wbc trending down  Less nausea  PCR pending      2. Relative hypotension secondary to hypovolemia due to severe gastroenteritis, continue IV fluids.  Monitor blood pressure closely.  Hold off on antihypertensive medications.     3. Syncope episodes; secondary to hypotension due to severe gastroenteritis, obtain CT of the brain to rule out any intracranial abnormality.  Monitor patient closely.     4. Severe leukocytosis; likely related to gastroenteritis, rule out C diff, repeat CBC in the morning.  Continue empiric antibiotic therapy.     5. Type 2 diabetes mellitus; place patient on insulin therapy.  Obtain A1c     6. Hypertension; blood pressure relatively low, hold off on antihypertensive medication for now.     7. Hyponatremia; secondary to diarrhea as well as hyperglycemia, continue normal saline.  Monitor sodium level closely.  Obtain TSH level as well.    8. Metabolic acidosis; secondary to diarrhea as, we will give sodium bicarbonate.  Repeat BNP in the morning.     9. Maintain the patient on  DVT prophylaxis by way of Lovenox            Rick Luu MD  [facility] Medicine

## 2023-04-19 NOTE — H&P
Chief Complaint; nausea, vomiting and diarrhea since yesterday    HPI:   Patient is a 77 y.o. female with a medical history of hypertension and type 2 diabetes mellitus presents to the ER on account of nausea, vomiting and diarrhea since yesterday.    Patient has been at her usual state of health until yesterday when she developed episodes of nausea, nonbloody vomiting and nonbloody and diarrhea.  There has been associated diffuse nonspecific abdominal pain, pain was about 5/10 in intensity, nonradiating with no aggravating or relieving factor.  There has been associated generalized body weakness, and she had episode of syncope while she was walking after coming out of the bathroom.  She presented to the ER for further evaluation.    At the ER, CT of the abdomen was done and shows evidence of gastroenteritis.  Lactic acid was also elevated.    PCP Slava Carty Iii, MD MD        Past Medical History:   Diagnosis Date    Diabetes mellitus     Hypertension         Past Surgical History:   Procedure Laterality Date    APPENDECTOMY      CHOLECYSTECTOMY      COLON SURGERY      HERNIA REPAIR          Medications Prior to Admission   Medication Sig Dispense Refill Last Dose    LORazepam (ATIVAN) 1 MG tablet Take 1 mg by mouth every 8 (eight) hours as needed.       amLODIPine (NORVASC) 10 MG tablet Take 10 mg by mouth.       cetirizine (ZYRTEC) 10 MG tablet Take 1 tablet (10 mg total) by mouth once daily. 30 tablet 0     glimepiride (AMARYL) 4 MG tablet Take 4 mg by mouth 2 (two) times daily.       hydroCHLOROthiazide (MICROZIDE) 12.5 mg capsule Take 12.5 mg by mouth.       metFORMIN (GLUCOPHAGE-XR) 500 MG ER 24hr tablet SMARTSI Tablet(s) By Mouth Morning-Evening       methylPREDNISolone (MEDROL DOSEPACK) 4 mg tablet use as directed 21 each 0        Review of patient's allergies indicates:   Allergen Reactions    Keflex [cephalexin]     Sulfa (sulfonamide antibiotics)         Social History     Tobacco Use    Smoking  "status: Not on file    Smokeless tobacco: Not on file   Substance Use Topics    Alcohol use: Not on file        No family history on file.    Review of Systems  Review of Systems   Constitutional: Negative for fever.  Positive for syncope  HEENT: Negative for drooling, ear pain, facial swelling and nosebleeds.    Eyes: Negative for discharge and visual disturbance.   Respiratory: Negative for cough, negative shortness of breath.    Cardiovascular: negative for chest pain or SOB  Gastrointestinal; abdominal pain, nausea, vomiting, diarrhea  Genitourinary: Negative for decreased urine volume and dysuria  Musculoskeletal: Negative for neck pain.   Skin: Negative for rash.   Neurological: negative for numbness, negative for weakness,negative for seizures and facial asymmetry.              Objective:     I/O this shift:  In: 100 [IV Piggyback:100]  Out: -     BP (!) 89/64 (Patient Position: Lying)   Pulse 81   Temp 99.6 °F (37.6 °C) (Oral)   Resp 20   Ht 5' 4" (1.626 m)   Wt 59.9 kg (132 lb)   SpO2 95%   BMI 22.66 kg/m²     General Appearance:    Awake, alert, not in acute distress   HEENT:   atraumatic, PERRL, EOM intact, conjuctiva pink, sclera anicteric, oropharynx moist, no lesions noted   Neck:    Supple, no JVD, no carotid bruits, no lymphadenopathy or thyromegaly noted       Pulmonary:   Clear to auscultation bilaterally, reduced breath sounds bileterally.   Cardiovascular:    Regular rate and rhythm, S1 S2 normal   Abdomen:     Soft, non-tender,nondistended, bowel sounds active all four quadrants, no masses, no organomegaly   Extremities:  no edema, no cyanosis or clubbing noted       Skin:   No bruises noted.       Neurologic: Alert, awake, oriented x 3, moves all extremities.                 Data Review :   Labs:    CBC:   Lab Results   Component Value Date    WBC 27.6 (H) 04/18/2023    RBC 4.98 04/18/2023    HGB 13.8 04/18/2023    HCT 41.3 04/18/2023     04/18/2023     CMP:   Lab Results "   Component Value Date     (L) 04/18/2023    K 3.3 (L) 04/18/2023    CO2 19 (L) 04/18/2023    BUN 12.0 04/18/2023    CREATININE 0.83 04/18/2023    CALCIUM 9.3 04/18/2023    ALKPHOS 103 04/18/2023    AST 35 04/18/2023    ALT 26 04/18/2023    ALBUMIN 3.7 04/18/2023    BILITOT 0.8 04/18/2023     Cardiac markers: No results found for: BNP, CKMB, CKTOTAL, TROPONIN, MYOGLOBIN    Radiology:  Micro:  No components found for: BLOODCX, SPUTUMCX, URINECX    [facility]:  [facility]        Assessment & Plan:   1. Severe acute gastroenteritis; rule out infectious induced, place patient on IV fluids, empiric IV Levaquin and Flagyl, obtain stool workup.  Obtain COVID-19 testing.  Rule out C diff. CT of the abdomen shows evidence of acute gastroenteritis.    2. Relative hypotension secondary to hypovolemia due to severe gastroenteritis, continue IV fluids.  Monitor blood pressure closely.  Hold off on antihypertensive medications.    3. Syncope episodes; secondary to hypotension due to severe gastroenteritis, obtain CT of the brain to rule out any intracranial abnormality.  Monitor patient closely.    4. Severe leukocytosis; likely related to gastroenteritis, rule out C diff, repeat CBC in the morning.  Continue empiric antibiotic therapy.    5. Type 2 diabetes mellitus; place patient on insulin therapy.  Obtain A1c    6. Hypertension; blood pressure relatively low, hold off on antihypertensive medication for now.    7. Hyponatremia; secondary to diarrhea as well as hyperglycemia, continue normal saline.  Monitor sodium level closely.  Obtain TSH level as well.    8. Metabolic acidosis; secondary to diarrhea as, we will give sodium bicarbonate.  Repeat BNP in the morning.    9. Maintain the patient on DVT prophylaxis by way of Lovenox    Disposition; continue above-stated management.  This note was completed using voice recognition software and transcription errors may occur.    This Encounter was via Telemedicine (Both audio  and visual ) and from [place], [place].  Patient was located in [place].    Evaluation of the patient was made alongside the patient's nursing staff    Patient will require 2 midnights of hospitalization management.          Rajan Joyce  4/19/2023  4:42 AM

## 2023-04-19 NOTE — ED PROVIDER NOTES
Encounter Date: 4/18/2023       History     Chief Complaint   Patient presents with    Fall     C/O WEAKNESS X 2 HRS PTA AND FELL WHILE WALKING IN [place]. ALSO C/O N/V/D.      Patient presents with the onset tonight of an episode of abdominal pain that resolved, associated diarrhea, nausea and chills.  When the abdominal pain came upon her, it precipitated an episode of syncope.  At this time she does not have any pain complaints no chest or abdominal pain no headache.  She does complain of chills and persistent nausea.  She is had a cholecystectomy, partial colon resection, intestinal obstruction.  She takes medication for blood pressure and diabetes.      Review of patient's allergies indicates:   Allergen Reactions    Keflex [cephalexin]     Sulfa (sulfonamide antibiotics)      Past Medical History:   Diagnosis Date    Diabetes mellitus     Hypertension      Past Surgical History:   Procedure Laterality Date    APPENDECTOMY      CHOLECYSTECTOMY      COLON SURGERY      HERNIA REPAIR       No family history on file.     Review of Systems   Constitutional:  Positive for chills.   Respiratory: Negative.     Cardiovascular: Negative.    Gastrointestinal:  Positive for abdominal pain, diarrhea and nausea.   Neurological:  Positive for syncope.     Physical Exam     Initial Vitals [04/18/23 2158]   BP Pulse Resp Temp SpO2   113/75 99 20 96 °F (35.6 °C) 98 %      MAP       --         Physical Exam    Constitutional: She appears well-developed and well-nourished.   HENT:   Head: Normocephalic and atraumatic.   Eyes: EOM are normal. Pupils are equal, round, and reactive to light.   Cardiovascular:  Normal rate, regular rhythm and normal heart sounds.           Pulmonary/Chest: Breath sounds normal.   Abdominal: Abdomen is soft. Bowel sounds are normal.   Musculoskeletal:         General: Normal range of motion.     Neurological: She is alert and oriented to person, place, and time. No cranial nerve deficit.       ED Course    Critical Care    Date/Time: 4/18/2023 10:00 PM  Performed by: Keith Alcaraz MD  Authorized by: Keith Alcaraz MD   Direct patient critical care time: 15 minutes  Additional history critical care time: 5 minutes  Ordering / reviewing critical care time: 15 minutes  Documentation critical care time: 10 minutes  Consulting other physicians critical care time: 5 minutes  Consult with family critical care time: 10 minutes  Total critical care time (exclusive of procedural time) : 60 minutes  Critical care was necessary to treat or prevent imminent or life-threatening deterioration of the following conditions: sepsis.  Critical care was time spent personally by me on the following activities: development of treatment plan with patient or surrogate, discussions with consultants, evaluation of patient's response to treatment, examination of patient, obtaining history from patient or surrogate, ordering and performing treatments and interventions, ordering and review of laboratory studies, ordering and review of radiographic studies and re-evaluation of patient's condition.      Labs Reviewed   COMPREHENSIVE METABOLIC PANEL - Abnormal; Notable for the following components:       Result Value    Sodium Level 126 (*)     Potassium Level 3.3 (*)     Chloride 97 (*)     Carbon Dioxide 19 (*)     Glucose Level 231 (*)     All other components within normal limits   URINALYSIS - Abnormal; Notable for the following components:    Protein, UA >=300 (*)     Glucose,  (*)     Ketones, UA 15 (*)     Bilirubin, UA Moderate (*)     Urobilinogen, UA 2.0 (*)     All other components within normal limits    Narrative:      URINE STABILITY IS 2 HOURS AT ROOM TEMP OR    SIX HOURS REFRIGERATED. PERFORMING TESTING ON    SPECIMENS GREATER THAN THIS AGE MAY AFFECT THE    FOLLOWING TESTS:    PH          SPECIFIC GRAVITY           BLOOD    CLARITY     BILIRUBIN               UROBILINOGEN   LACTIC ACID, PLASMA - Abnormal; Notable  for the following components:    Lactic Acid Level 3.9 (*)     All other components within normal limits   CBC WITH DIFFERENTIAL - Abnormal; Notable for the following components:    WBC 27.6 (*)     MPV 8.3 (*)     IG# 0.37 (*)     IG% 1.3 (*)     All other components within normal limits   MANUAL DIFFERENTIAL - Abnormal; Notable for the following components:    Neut Man 86 (*)     Lymph Man 7 (*)     Abs Neut calc 24.012 (*)     Abs Mono 1.656 (*)     All other components within normal limits   RAPID INFLUENZA A/B - Normal   MAGNESIUM - Normal   CK - Normal   TROPONIN I - Normal   URINALYSIS, MICROSCOPIC - Normal   CBC W/ AUTO DIFFERENTIAL    Narrative:     The following orders were created for panel order CBC auto differential.  Procedure                               Abnormality         Status                     ---------                               -----------         ------                     CBC with Differential[[id number]]        Abnormal            Final result               Manual Differential[[id number]]          Abnormal            Final result                 Please view results for these tests on the individual orders.   LACTIC ACID, PLASMA          Imaging Results              X-Ray Chest AP Portable (Preliminary result)  Result time 04/19/23 00:33:35      Wet Read by Keith Alcaraz MD (04/19/23 00:33:35, [facility], Emergency Medicine)    neg                                     CT Abdomen Pelvis  Without Contrast (Final result)  Result time 04/19/23 00:24:28      Final result by Philippe Ya MD (04/19/23 00:24:28)                   Impression:        1. Fluid is present in multiple loops of large and small bowel, likely on the basis of gastroenteritis.  2. Postsurgical and chronic findings as described above.  n/a    CATEGORY: n/a    The following dose reduction techniques are used for all CT at [facility]:    1.   Automated exposure  control.    2.   Adjustment of the mA and/or kV according to patient size.    3.   Use of iterative reconstruction technique.      Electronically signed by: Philippe Ya  Date:    04/19/2023  Time:    00:24               Narrative:      STUDY:CT SCAN OF THE ABDOMEN AND PELVIS WITH INTRAVENOUS CONTRAST    CLINICAL HISTORY & TECHNIQUE:    X 2 HRS PTA  WEAKNESS  FALL   N \V\D    PMH: VERTIGO  HTN  DM  FORMER SMOKER  COLON TUMOR WITH SX  BOWEL OBSTRUCTION  MODESTA    COMPARISON:  01/07/2016    FINDINGS:    Liver:  No clinically significant abnormalities noted.    Gallbladder/Biliary System:  Compatible with a previous cholecystectomy.    Spleen:  No clinically significant abnormalities noted.    Adrenal glands:  No clinically significant abnormalities noted.    Pancreas:  No clinically significant abnormalities noted.    Kidneys/Urinary Tract:  No clinically significant abnormalities noted.    Urinary bladder:  No clinically significant abnormalities noted.    Prostate gland/uterus and ovaries:  No clinically significant abnormalities noted.    GI tract:  Fluid is present in multiple loops of bowel, large and small.  Postsurgical findings are also present.    Vascular structures:  Fairly extensive atherosclerotic calcification is present involving the aorta and its major branches.    Musculoskeletal structures:  Moderate bony demineralization is present with moderate degenerative findings noted throughout the bony elements.    Miscellaneous:  No clinically significant abnormalities noted.                                       CT Head Without Contrast (Final result)  Result time 04/19/23 00:13:44      Final result by Philippe Ya MD (04/19/23 00:13:44)                   Impression:      1.   Stable chronic findings having a similar appearance to the prior study of of 05/14/2022.      Electronically signed by: Philippe Ya  Date:    04/19/2023  Time:    00:13               Narrative:    EXAMINATION:  CT HEAD WITHOUT  CONTRAST    CLINICAL HISTORY:  syncope;    TECHNIQUE:  Low dose axial images were obtained through the head.  Coronal and sagittal reformations were also performed. Contrast was not administered.    COMPARISON:  05/14/2022    FINDINGS:  One uaxc-rd-rwsg comparison is made to the prior examination, allowing for differences in positioning and technique, no significant or suspicious interval change is appreciated.  Again noted is mild generalized atrophy with mild areas of decreased attenuation involving the periventricular deep white matter on both sides compatible with mild chronic ischemic disease.  A previously seen metallic devices again noted producing some artifact along the left side of the bony calvarium.  No intracranial mass, midline shift or intracranial hemorrhage or evidence of dominant wedge-shaped infarct is identified.                                       Medications   sodium chloride 0.9% bolus 1,000 mL 1,000 mL (has no administration in time range)   ondansetron injection 8 mg (8 mg Intravenous Given 4/18/23 2228)   prochlorperazine injection Soln 5 mg (5 mg Intravenous Given 4/18/23 2245)   piperacillin-tazobactam (ZOSYN) 4.5 g in dextrose 5 % in water (D5W) 5 % 100 mL IVPB (MB+) (4.5 g Intravenous New Bag 4/19/23 0053)                              Clinical Impression:   Final diagnoses:  [R55] Syncope  [R05.9] Cough  [A41.9, R65.20] Severe sepsis (Primary)        ED Disposition Condition    Observation Stable                Keith Alcaraz MD  04/19/23 6113

## 2023-04-19 NOTE — PT/OT/SLP EVAL
Physical Therapy Evaluation    Patient Name:  Laureen Strickland   MRN:  [id number]    Recommendations:     Discharge Recommendations: home with home health, home   Discharge Equipment Recommendations: none   Barriers to discharge: None    Assessment:     Laureen Strickland is a 77 y.o. female admitted with a medical diagnosis of <principal problem not specified>.  She presents with the following impairments/functional limitations: weakness, impaired functional mobility, impaired endurance, decreased lower extremity function Patient tolerated gait well, educated on Low back and piriformis stretches to improve sciatic issues. .    Rehab Prognosis: Good; patient would benefit from acute skilled PT services to address these deficits and reach maximum level of function.    Recent Surgery: * No surgery found *      Plan:     During this hospitalization, patient to be seen daily to address the identified rehab impairments via gait training, therapeutic activities, therapeutic exercises and progress toward the following goals:    Plan of Care Expires:  05/19/23    Subjective     Chief Complaint: weakness   Patient/Family Comments/goals: to get stronger to go home  Pain/Comfort:  Pain Rating 1: 0/10    Patients cultural, spiritual, Yarsanism conflicts given the current situation:      Living Environment:  Lives with   Prior to admission, patients level of function was independent .  Equipment used at home: none.  DME owned (not currently used): none.  Upon discharge, patient will have assistance from family.    Objective:     Communicated with nursing prior to session.  Patient found HOB elevated with peripheral IV, gonzalez catheter  upon PT entry to room.    General Precautions: Standard, fall  Orthopedic Precautions:N/A   Braces: N/A  Respiratory Status: Room air    Exams:  RLE Strength: WFL  LLE Strength: WFL    Functional Mobility:  Bed Mobility:     Supine to Sit: contact guard assistance  Sit to Supine: contact  guard assistance  Transfers:     Sit to Stand:  contact guard assistance with no AD  Gait: 60 feet with no AD with CGA      AM-PAC 6 CLICK MOBILITY  Total Score:18       Treatment & Education:  See above plus BLE seated therex while sitting EOB    Patient left HOB elevated with all lines intact and call button in reach.    GOALS:   Multidisciplinary Problems       Physical Therapy Goals          Problem: Physical Therapy    Goal Priority Disciplines Outcome Goal Variances Interventions   Physical Therapy Goal     PT, PT/OT Ongoing, Progressing     Description: Goals to be met by: discharge     Patient will increase functional independence with mobility by performin. Sit to stand transfer with Modified [place]  2. Gait  x 150 feet with Modified [place] using No Assistive Device.                          History:     Past Medical History:   Diagnosis Date    Diabetes mellitus     Hypertension        Past Surgical History:   Procedure Laterality Date    APPENDECTOMY      CHOLECYSTECTOMY      COLON SURGERY      HERNIA REPAIR         Time Tracking:     PT Received On: 23  PT Start Time: 1550     PT Stop Time: 1615  PT Total Time (min): 25 min     Billable Minutes: Evaluation 15 and Gait Training 10      2023

## 2023-04-20 LAB
ALBUMIN SERPL-MCNC: 2.7 G/DL (ref 3.4–5)
ALBUMIN/GLOB SERPL: 1.2 RATIO
ALP SERPL-CCNC: 77 UNIT/L (ref 50–144)
ALT SERPL-CCNC: 14 UNIT/L (ref 1–45)
ANION GAP SERPL CALC-SCNC: 4 MEQ/L (ref 2–13)
AST SERPL-CCNC: 19 UNIT/L (ref 14–36)
BASOPHILS # BLD AUTO: 0.02 X10(3)/MCL (ref 0.01–0.08)
BASOPHILS NFR BLD AUTO: 0.2 % (ref 0.1–1.2)
BILIRUBIN DIRECT+TOT PNL SERPL-MCNC: 0.7 MG/DL (ref 0–1)
BNP BLD-MCNC: 424 PG/ML (ref 10–450)
BUN SERPL-MCNC: 5 MG/DL (ref 7–20)
CALCIUM SERPL-MCNC: 7.5 MG/DL (ref 8.4–10.2)
CHLORIDE SERPL-SCNC: 102 MMOL/L (ref 98–110)
CO2 SERPL-SCNC: 26 MMOL/L (ref 21–32)
COLOR STL: ABNORMAL
CONSISTENCY STL: ABNORMAL
CREAT SERPL-MCNC: 0.57 MG/DL (ref 0.66–1.25)
CREAT/UREA NIT SERPL: 9 (ref 12–20)
EOSINOPHIL # BLD AUTO: 0.04 X10(3)/MCL (ref 0.04–0.36)
EOSINOPHIL NFR BLD AUTO: 0.4 % (ref 0.7–7)
ERYTHROCYTE [DISTWIDTH] IN BLOOD BY AUTOMATED COUNT: 13.4 % (ref 11–14.5)
EST. AVERAGE GLUCOSE BLD GHB EST-MCNC: 188.6 MG/DL (ref 70–115)
GFR SERPLBLD CREATININE-BSD FMLA CKD-EPI: >90 MLS/MIN/1.73/M2
GLOBULIN SER-MCNC: 2.2 GM/DL (ref 2–3.9)
GLUCOSE SERPL-MCNC: 129 MG/DL (ref 70–115)
HBA1C MFR BLD: 8.2 % (ref 4–6)
HCT VFR BLD AUTO: 32.5 % (ref 36–48)
HEMOCCULT SP2 STL QL: POSITIVE
HGB BLD-MCNC: 10.9 G/DL (ref 11.8–16)
IMM GRANULOCYTES # BLD AUTO: 0.05 X10(3)/MCL (ref 0–0.03)
IMM GRANULOCYTES NFR BLD AUTO: 0.5 % (ref 0–0.5)
LYMPHOCYTES # BLD AUTO: 0.86 X10(3)/MCL (ref 1.16–3.74)
LYMPHOCYTES NFR BLD AUTO: 8.4 % (ref 20–55)
MCH RBC QN AUTO: 27.3 PG (ref 27–34)
MCV RBC AUTO: 81.5 FL (ref 79–99)
MEAN CELL HEMOGLOBIN CONCENTRATION (OHS) G/DL: 33.5 G/DL (ref 31–37)
MONOCYTES # BLD AUTO: 0.7 X10(3)/MCL (ref 0.24–0.36)
MONOCYTES NFR BLD AUTO: 6.8 % (ref 4.7–12.5)
NEUTROPHILS # BLD AUTO: 8.56 X10(3)/MCL (ref 1.56–6.13)
NEUTROPHILS NFR BLD AUTO: 83.7 % (ref 37–73)
NRBC BLD AUTO-RTO: 0 % (ref 0–1)
PLATELET # BLD AUTO: 174 X10(3)/MCL (ref 140–371)
PMV BLD AUTO: 8.4 FL (ref 9.4–12.4)
POCT GLUCOSE: 119 MG/DL (ref 70–110)
POCT GLUCOSE: 120 MG/DL (ref 70–110)
POCT GLUCOSE: 157 MG/DL (ref 70–110)
POCT GLUCOSE: 175 MG/DL (ref 70–110)
POTASSIUM SERPL-SCNC: 3.4 MMOL/L (ref 3.5–5.1)
PROT SERPL-MCNC: 4.9 GM/DL (ref 6.3–8.2)
RBC # BLD AUTO: 3.99 X10(6)/MCL (ref 4–5.1)
SODIUM SERPL-SCNC: 132 MMOL/L (ref 135–145)
WBC # SPEC AUTO: 10.2 X10(3)/MCL (ref 4–11.5)

## 2023-04-20 PROCEDURE — 25000003 PHARM REV CODE 250: Performed by: FAMILY MEDICINE

## 2023-04-20 PROCEDURE — 63600175 PHARM REV CODE 636 W HCPCS: Performed by: INTERNAL MEDICINE

## 2023-04-20 PROCEDURE — S0030 INJECTION, METRONIDAZOLE: HCPCS | Performed by: FAMILY MEDICINE

## 2023-04-20 PROCEDURE — 94761 N-INVAS EAR/PLS OXIMETRY MLT: CPT

## 2023-04-20 PROCEDURE — 63600175 PHARM REV CODE 636 W HCPCS: Performed by: FAMILY MEDICINE

## 2023-04-20 PROCEDURE — 83036 HEMOGLOBIN GLYCOSYLATED A1C: CPT | Performed by: INTERNAL MEDICINE

## 2023-04-20 PROCEDURE — 51702 INSERT TEMP BLADDER CATH: CPT

## 2023-04-20 PROCEDURE — 25000003 PHARM REV CODE 250: Performed by: INTERNAL MEDICINE

## 2023-04-20 PROCEDURE — 36415 COLL VENOUS BLD VENIPUNCTURE: CPT | Performed by: FAMILY MEDICINE

## 2023-04-20 PROCEDURE — 83880 ASSAY OF NATRIURETIC PEPTIDE: CPT | Performed by: INTERNAL MEDICINE

## 2023-04-20 PROCEDURE — 85025 COMPLETE CBC W/AUTO DIFF WBC: CPT | Performed by: FAMILY MEDICINE

## 2023-04-20 PROCEDURE — 21400001 HC TELEMETRY ROOM

## 2023-04-20 PROCEDURE — 97110 THERAPEUTIC EXERCISES: CPT | Mod: CQ

## 2023-04-20 PROCEDURE — 80053 COMPREHEN METABOLIC PANEL: CPT | Performed by: FAMILY MEDICINE

## 2023-04-20 RX ADMIN — MUPIROCIN: 20 OINTMENT TOPICAL at 08:04

## 2023-04-20 RX ADMIN — SODIUM CHLORIDE AND POTASSIUM CHLORIDE: 150; 900 INJECTION, SOLUTION INTRAVENOUS at 02:04

## 2023-04-20 RX ADMIN — ENOXAPARIN SODIUM 40 MG: 100 INJECTION SUBCUTANEOUS at 04:04

## 2023-04-20 RX ADMIN — METRONIDAZOLE 500 MG: 5 INJECTION, SOLUTION INTRAVENOUS at 05:04

## 2023-04-20 RX ADMIN — METRONIDAZOLE 500 MG: 5 INJECTION, SOLUTION INTRAVENOUS at 01:04

## 2023-04-20 RX ADMIN — LEVOFLOXACIN 500 MG: 500 INJECTION, SOLUTION INTRAVENOUS at 01:04

## 2023-04-20 RX ADMIN — METRONIDAZOLE 500 MG: 5 INJECTION, SOLUTION INTRAVENOUS at 11:04

## 2023-04-20 NOTE — PLAN OF CARE
Problem: Adult Inpatient Plan of Care  Goal: Plan of Care Review  Outcome: Ongoing, Progressing  Goal: Absence of Hospital-Acquired Illness or Injury  Outcome: Ongoing, Progressing  Goal: Optimal Comfort and Wellbeing  Outcome: Ongoing, Progressing  Goal: Readiness for Transition of Care  Outcome: Ongoing, Progressing     Problem: Infection  Goal: Absence of Infection Signs and Symptoms  Outcome: Ongoing, Progressing     Problem: Hyperglycemia  Goal: Blood Glucose Level Within Targeted Range  Outcome: Ongoing, Progressing

## 2023-04-20 NOTE — PLAN OF CARE
Problem: Adult Inpatient Plan of Care  Goal: Plan of Care Review  Outcome: Ongoing, Progressing  Goal: Patient-Specific Goal (Individualized)  Outcome: Ongoing, Progressing  Goal: Absence of Hospital-Acquired Illness or Injury  Outcome: Ongoing, Progressing  Goal: Optimal Comfort and Wellbeing  Outcome: Ongoing, Progressing  Goal: Readiness for Transition of Care  Outcome: Ongoing, Progressing     Problem: Infection  Goal: Absence of Infection Signs and Symptoms  Outcome: Ongoing, Progressing     Problem: Hyperglycemia  Goal: Blood Glucose Level Within Targeted Range  Outcome: Ongoing, Progressing

## 2023-04-20 NOTE — PROGRESS NOTES
"Inpatient Nutrition Evaluation    Admit Date: 4/18/2023   Total duration of encounter: 2 days    Nutrition Recommendation/Prescription     Add NCS modifier to Bartlett Diet.    Add Boost Glucose Control BID (380 kcal, 32 gm pro).    RD to monitor patients PO Intake, Weight, Labs, and adjust MNT as needed.       Nutrition Assessment     Chart Review    Reason Seen: continuous nutrition monitoring    Malnutrition Screening Tool Results   Have you recently lost weight without trying?: No  Have you been eating poorly because of a decreased appetite?: No   MST Score: 0     Diagnosis:  Severe Acute Gastroenteritis, Relative Hypotension secondary to Gastroenteritis, Syncope Episodes, Sever Leukocytosis, T2DM, HTN, Hyponatremia, Metabolic Acidosis.    Relevant Medical History: DM, HTN.    Nutrition-Related Medications: 0.9% NaCL w/ KCL @ 50 ml/hr.     Nutrition-Related Labs:  (4/20): Glucose- 129H.    Diet Order: Diet [place] No Concentrated Sweets  Oral Supplement Order: none  Appetite/Oral Intake: poor/25-50% of meals 50%- 1 Meal on FLD.   Factors Affecting Nutritional Intake: altered gastrointestinal function  Food/Quaker/Cultural Preferences: none reported  Food Allergies: none reported and no known food allergies       Wound(s):       Comments    (4/20) Per nurse, patient eating first meal on [place] diet. Drinking well. No complaints or issues at this time.     Anthropometrics    Height: 5' 4" (162.6 cm)    Last Weight: 60 kg (132 lb 3.2 oz) (04/19/23 1950) Weight Method: Bed Scale  BMI (Calculated): 22.7  BMI Classification: normal (BMI 18.5-24.9)     Ideal Body Weight (IBW), Female: 120 lb     % Ideal Body Weight, Female (lb): 110 %                             Usual Weight Provided By: EMR weight history    Wt Readings from Last 5 Encounters:   04/19/23 60 kg (132 lb 3.2 oz)   04/03/23 59 kg (130 lb)   04/01/23 59 kg (130 lb)   02/04/20 65.9 kg (145 lb 4.5 oz)     Weight Change(s) Since Admission:  Admit Weight: 59 kg " (130 lb) (04/18/23 6584)      Patient Education    Not applicable.    Monitoring & Evaluation     Dietitian will monitor food and beverage intake, energy intake, weight, weight change, glucose/endocrine profile, and gastrointestinal profile.  Nutrition Risk/Follow-Up: low (follow-up in 5-7 days)  Patients assigned 'low nutrition risk' status do not qualify for a full nutritional assessment but will be monitored and re-evaluated in a 5-7 day time period. Please consult if re-evaluation needed sooner.

## 2023-04-20 NOTE — PT/OT/SLP PROGRESS
Physical Therapy Treatment    Patient Name:  Laureen Strickland   MRN:  [id number]    Recommendations:     Discharge Recommendations: home with home health, home  Discharge Equipment Recommendations: none  Barriers to discharge: None    Assessment:     Laureen Strickland is a 77 y.o. female admitted with a medical diagnosis of <principal problem not specified>.  She presents with the following impairments/functional limitations: weakness, impaired functional mobility, impaired endurance, decreased lower extremity function .    Rehab Prognosis: Good; patient would benefit from acute skilled PT services to address these deficits and reach maximum level of function.    Recent Surgery: * No surgery found *      Plan:     During this hospitalization, patient to be seen daily to address the identified rehab impairments via gait training, therapeutic activities, therapeutic exercises and progress toward the following goals:    Plan of Care Expires:  05/19/23    Subjective     Chief Complaint: weakness and poor endurance  Patient/Family Comments/goals: improved strength and endurance for tf and gait  Pain/Comfort:  Pain Rating 1: 0/10      Objective:     Communicated with nsg prior to session.  Patient found supine with   upon PT entry to room.     General Precautions: Standard, fall  Orthopedic Precautions: N/A  Braces: N/A  Respiratory Status: Room air     Functional Mobility:  Bed Mobility:     Rolling Left:  contact guard assistance  Transfers:     Sit to Stand:  contact guard assistance with hand-held assist      AM-PAC 6 CLICK MOBILITY  Turning over in bed (including adjusting bedclothes, sheets and blankets)?: 3  Sitting down on and standing up from a chair with arms (e.g., wheelchair, bedside commode, etc.): 3  Moving from lying on back to sitting on the side of the bed?: 3  Need to walk in hospital room?: 2  Climbing 3-5 steps with a railing?: 1       Treatment & Education:  Pt performed bilateral LE therex x 10 x 3 in  sitting at bedside.    Patient left sitting edge of bed with call button in reach..    GOALS:   Multidisciplinary Problems       Physical Therapy Goals          Problem: Physical Therapy    Goal Priority Disciplines Outcome Goal Variances Interventions   Physical Therapy Goal     PT, PT/OT Ongoing, Progressing     Description: Goals to be met by: discharge     Patient will increase functional independence with mobility by performin. Sit to stand transfer with Modified [place]  2. Gait  x 150 feet with Modified [place] using No Assistive Device.                          Time Tracking:     PT Received On: 23  PT Start Time: 630     PT Stop Time: 645  PT Total Time (min): 15 min     Billable Minutes: Therapeutic Exercise 15    Treatment Type: Treatment  PT/PTA: PTA     Number of PTA visits since last PT visit: 2023

## 2023-04-20 NOTE — PROGRESS NOTES
Hospital Medicine  Progress Note    Patient Name: Laureen Strickland  MRN: [id number]  Status: IP- Inpatient   Admission Date: 4/18/2023  Length of Stay: 1      CC: hospital follow-up for        SUBJECTIVE    77 y.o. female with a medical history of hypertension and type 2 diabetes mellitus presents to the ER on account of nausea, vomiting and diarrhea since yesterday.    Patient has been at her usual state of health until yesterday when she developed episodes of nausea, nonbloody vomiting and nonbloody and diarrhea.  There has been associated diffuse nonspecific abdominal pain, pain was about 5/10 in intensity, nonradiating with no aggravating or relieving factor.  There has been associated generalized body weakness, and she had episode of syncope while she was walking after coming out of the bathroom.  She presented to the ER for further evaluation.    At the ER, CT of the abdomen was done and shows evidence of gastroenteritis.  Lactic acid was also elevated.     04/19/2023 feel better this AM   Wbc trend down to 20  Lactic down 1.8     04/20/2023  Feel better today labs improving     Today: Patient seen and examined at bedside, and chart reviewed.       MEDICATIONS   Scheduled   enoxaparin  40 mg Subcutaneous Daily    levoFLOXacin  500 mg Intravenous Q24H    metronidazole  500 mg Intravenous Q8H    mupirocin   Nasal BID     Continuous Infusions   0/9% NACL & POTASSIUM CHLORIDE 20 MEQ/L 75 mL/hr at 04/20/23 0248         PHYSICAL EXAM   VITALS: T 99 °F (37.2 °C)   BP (!) 143/69   P 77   RR 18   O2 97 %    GENERAL: Awake and in NAD  LUNGS: CTA B/L  CVS: Normal rate  GI/: Soft, NT, bowel sounds positive.  EXTREMITIES: No peripheral edema  NEURO: AAOx3  PSYCH: Cooperative      LABS   CBC  Recent Labs     04/19/23  0430 04/20/23  0421   WBC 20.4* 10.2   RBC 4.30 3.99*   HGB 11.9 10.9*   HCT 35.1* 32.5*   MCV 81.6 81.5   MCH 27.7 27.3   MCHC 33.9 33.5   RDW 13.1 13.4    174     CHEM  Recent Labs     04/18/23  9993  04/19/23  0430 04/20/23  0421   * 127* 132*   K 3.3* 3.8 3.4*   CHLORIDE 97* 96* 102   CO2 19* 24 26   BUN 12.0 11.0 5.0*   CREATININE 0.83 0.73 0.57*   GLUCOSE 231* 216* 129*   CALCIUM 9.3 7.9* 7.5*   MG 1.80  --   --    ALBUMIN 3.7 3.1* 2.7*   GLOBULIN 2.8 2.4 2.2   ALKPHOS 103 98 77   ALT 26 19 14   AST 35 26 19   BILITOT 0.8 1.0 0.7   TSH  --  0.617  --          MICROBIOLOGY     Microbiology Results (last 7 days)       Procedure Component Value Units Date/Time    Clostridium Diff Toxin, A & B, EIA [[id number]]  (Normal) Collected: 04/19/23 0930    Order Status: Completed Specimen: Stool Updated: 04/19/23 1427     Clostridium Difficile GDH Antigen Negative     Clostridium Difficile Toxin A/B Negative    C Diff Toxin by PCR [[id number]] Collected: 04/19/23 0930    Order Status: Canceled Specimen: Stool Updated: 04/19/23 0936    Rapid Influenza A/B [[id number]]  (Normal) Collected: 04/18/23 2254    Order Status: Completed Specimen: Nasopharyngeal from Nasal Updated: 04/18/23 2340     Influenza A Negative     Influenza B Negative              DIAGNOSTICS   CT Head Without Contrast  Narrative: EXAMINATION:  STUDY: CT HEAD WITHOUT CONTRAST    CLINICAL HISTORY AND TECHNIQUE:  Efren Ivey RT on 4/19/2023  6:31 AM    ER PT    PROCEDURE: CT BRAIN WO    CLINICAL HX: X SEVERAL HRS  LT SIDED WEAKNESS   FACIAL DROOPING  SLURRED SPEECH    PMH:SMOKER  HTN  NEUROMAOR SCHWANNOMA    CV    TECHNIQUE:    IV CONTRAST:    ORAL CONTRAST: N/A    RECTAL CONTRAST: N/A    AXIAL IMAGING @ 5 MM INTERVALS W/MULTIPLANAR RECONSTRUCTION OF IMAGES    -TOTAL IMAGE NUMBER: 35    -CTDIVOL(MGY) HEAD:104.60   BODY:    -DLP (MGYCM) HEAD:1984.40      BODY:    TECH: RW    This patient has had 4 CT and nuclear medicine scans performed within the last 12 months.    The following DOSE REDUCTION TECHNIQUES are used for all CT scans at [facility]:    1. Automated exposure control.  2. Adjustment of the mA and/or kv according to  patient size.  3. Use of iterative reconstruction technique.    COMPARISON:  04/18/2023    FINDINGS:  Axial imaging through the head/brain was performed. Minimal cerebral atrophy accentuates the ventricles and sulci and is most pronounced within the frontal parietal lobes.  Minimal chronic ischemic changes are noted within the deep white matter of both cerebral hemispheres and does not appear excessive for the patient's age.  A left retroauricular electronic device is present as seen on previous imaging.  I see no intraparenchymal masses, hemorrhagic lesions, or dominant wedge-shaped infarcts. I see no extra-axial masses or abnormal fluid collections.  Impression: 1. Chronic changes are present as described above and as seen previously including minimal cerebral atrophy and chronic ischemic changes which do not appear excessive for the patient's age.  See above comments.    Electronically signed by: Jadiel Verduzco  Date:    04/19/2023  Time:    08:40  X-Ray Chest AP Portable  Narrative: EXAMINATION:  STUDY: XR CHEST AP PORTABLE    CLINICAL HISTORY AND TECHNIQUE:  Efren Ivey RT on 4/19/2023 12:09 AM    CURRENT CLINICAL HISTORY: ER PT    X 2 HRS PTA  WEAKNESS   N\V\D    PMH:  CV+, MODESTA, HTN, DM, COLON TUMOR, HIATAL HERNIA, GERD, FORMER SMOKER    TECHNIQUE: 1V PCXR    TECHNOLOGIST: SAMANTHA HOLMAN    COMPARISON:  11/13/2017    FINDINGS:  The cardiac, hilar, and mediastinal contours appear unremarkable.I see no lobar or segmental infiltrates.No significant pleural effusions are noted.There is moderate demineralization of the skeletal structures with the mild kyphoscoliotic curvature of the thoracic spine and mild degenerative changes noted throughout the thoracic spine.  Atherosclerotic calcifications are noted within the aortic arch.  Impression: 1. I see no lobar or segmental infiltrates or other significant abnormalities.    Electronically signed by: Jadiel Verduzco  Date:    04/19/2023  Time:    05:05  CT Abdomen  Pelvis  Without Contrast  Narrative: STUDY:CT SCAN OF THE ABDOMEN AND PELVIS WITH INTRAVENOUS CONTRAST    CLINICAL HISTORY & TECHNIQUE:    X 2 HRS PTA  WEAKNESS  FALL   N \V\D    PMH: VERTIGO  HTN  DM  FORMER SMOKER  COLON TUMOR WITH SX  BOWEL OBSTRUCTION  MODESTA    COMPARISON:  01/07/2016    FINDINGS:    Liver:  No clinically significant abnormalities noted.    Gallbladder/Biliary System:  Compatible with a previous cholecystectomy.    Spleen:  No clinically significant abnormalities noted.    Adrenal glands:  No clinically significant abnormalities noted.    Pancreas:  No clinically significant abnormalities noted.    Kidneys/Urinary Tract:  No clinically significant abnormalities noted.    Urinary bladder:  No clinically significant abnormalities noted.    Prostate gland/uterus and ovaries:  No clinically significant abnormalities noted.    GI tract:  Fluid is present in multiple loops of bowel, large and small.  Postsurgical findings are also present.    Vascular structures:  Fairly extensive atherosclerotic calcification is present involving the aorta and its major branches.    Musculoskeletal structures:  Moderate bony demineralization is present with moderate degenerative findings noted throughout the bony elements.    Miscellaneous:  No clinically significant abnormalities noted.  Impression: 1. Fluid is present in multiple loops of large and small bowel, likely on the basis of gastroenteritis.  2. Postsurgical and chronic findings as described above.  n/a    CATEGORY: n/a    The following dose reduction techniques are used for all CT at [facility]:    1.   Automated exposure control.    2.   Adjustment of the mA and/or kV according to patient size.    3.   Use of iterative reconstruction technique.    Electronically signed by: Philippe Ya  Date:    04/19/2023  Time:    00:24  CT Head Without Contrast  Narrative: EXAMINATION:  CT HEAD WITHOUT CONTRAST    CLINICAL  HISTORY:  syncope;    TECHNIQUE:  Low dose axial images were obtained through the head.  Coronal and sagittal reformations were also performed. Contrast was not administered.    COMPARISON:  05/14/2022    FINDINGS:  One [id number] comparison is made to the prior examination, allowing for differences in positioning and technique, no significant or suspicious interval change is appreciated.  Again noted is mild generalized atrophy with mild areas of decreased attenuation involving the periventricular deep white matter on both sides compatible with mild chronic ischemic disease.  A previously seen metallic devices again noted producing some artifact along the left side of the bony calvarium.  No intracranial mass, midline shift or intracranial hemorrhage or evidence of dominant wedge-shaped infarct is identified.  Impression: 1.   Stable chronic findings having a similar appearance to the prior study of of 05/14/2022.    Electronically signed by: Philippe Ya  Date:    04/19/2023  Time:    00:13        ASSESSMENT/PLAN:   1. Severe acute gastroenteritis; rule out infectious induced, place patient on IV fluids, empiric IV Levaquin and Flagyl, obtain stool workup.  Obtain COVID-19 testing.  Rule out C diff. CT of the abdomen shows evidence of acute gastroenteritis.  Feel better today  Wbc trending down  Less nausea  PCR pending      2. Relative hypotension secondary to hypovolemia due to severe gastroenteritis, continue IV fluids.  Monitor blood pressure closely.  Hold off on antihypertensive medications.     3. Syncope episodes; secondary to hypotension due to severe gastroenteritis, obtain CT of the brain to rule out any intracranial abnormality.  Monitor patient closely.     4. Severe leukocytosis; likely related to gastroenteritis, rule out C diff, repeat CBC in the morning.  Continue empiric antibiotic therapy.     5. Type 2 diabetes mellitus; place patient on insulin therapy.  Obtain A1c     6. Hypertension;  blood pressure relatively low, hold off on antihypertensive medication for now.     7. Hyponatremia; secondary to diarrhea as well as hyperglycemia, continue normal saline.  Monitor sodium level closely.  Obtain TSH level as well.    8. Metabolic acidosis; secondary to diarrhea as, we will give sodium bicarbonate.  Repeat BMP in the morning.          Prophylaxis: hussain Luu MD  [facility]

## 2023-04-21 VITALS
RESPIRATION RATE: 20 BRPM | SYSTOLIC BLOOD PRESSURE: 147 MMHG | BODY MASS INDEX: 22.57 KG/M2 | HEIGHT: 64 IN | OXYGEN SATURATION: 99 % | HEART RATE: 70 BPM | DIASTOLIC BLOOD PRESSURE: 72 MMHG | TEMPERATURE: 100 F | WEIGHT: 132.19 LBS

## 2023-04-21 PROBLEM — R65.20 SEVERE SEPSIS: Status: ACTIVE | Noted: 2023-04-21

## 2023-04-21 PROBLEM — A41.9 SEVERE SEPSIS: Status: ACTIVE | Noted: 2023-04-21

## 2023-04-21 LAB
ANION GAP SERPL CALC-SCNC: 3 MEQ/L (ref 2–13)
BASOPHILS # BLD AUTO: 0.02 X10(3)/MCL (ref 0.01–0.08)
BASOPHILS NFR BLD AUTO: 0.3 % (ref 0.1–1.2)
BUN SERPL-MCNC: 4 MG/DL (ref 7–20)
CALCIUM SERPL-MCNC: 7.7 MG/DL (ref 8.4–10.2)
CHLORIDE SERPL-SCNC: 103 MMOL/L (ref 98–110)
CO2 SERPL-SCNC: 27 MMOL/L (ref 21–32)
CREAT SERPL-MCNC: 0.52 MG/DL (ref 0.66–1.25)
CREAT/UREA NIT SERPL: 8 (ref 12–20)
EOSINOPHIL # BLD AUTO: 0.09 X10(3)/MCL (ref 0.04–0.36)
EOSINOPHIL NFR BLD AUTO: 1.2 % (ref 0.7–7)
ERYTHROCYTE [DISTWIDTH] IN BLOOD BY AUTOMATED COUNT: 13.6 % (ref 11–14.5)
GFR SERPLBLD CREATININE-BSD FMLA CKD-EPI: >90 MLS/MIN/1.73/M2
GLUCOSE SERPL-MCNC: 137 MG/DL (ref 70–115)
HCT VFR BLD AUTO: 33.2 % (ref 36–48)
HGB BLD-MCNC: 10.8 G/DL (ref 11.8–16)
IMM GRANULOCYTES # BLD AUTO: 0.04 X10(3)/MCL (ref 0–0.03)
IMM GRANULOCYTES NFR BLD AUTO: 0.5 % (ref 0–0.5)
LYMPHOCYTES # BLD AUTO: 1 X10(3)/MCL (ref 1.16–3.74)
LYMPHOCYTES NFR BLD AUTO: 13.6 % (ref 20–55)
MCH RBC QN AUTO: 27.2 PG (ref 27–34)
MCV RBC AUTO: 83.6 FL (ref 79–99)
MEAN CELL HEMOGLOBIN CONCENTRATION (OHS) G/DL: 32.5 G/DL (ref 31–37)
MONOCYTES # BLD AUTO: 0.58 X10(3)/MCL (ref 0.24–0.36)
MONOCYTES NFR BLD AUTO: 7.9 % (ref 4.7–12.5)
NEUTROPHILS # BLD AUTO: 5.63 X10(3)/MCL (ref 1.56–6.13)
NEUTROPHILS NFR BLD AUTO: 76.5 % (ref 37–73)
NRBC BLD AUTO-RTO: 0 % (ref 0–1)
PLATELET # BLD AUTO: 163 X10(3)/MCL (ref 140–371)
PMV BLD AUTO: 8.2 FL (ref 9.4–12.4)
POCT GLUCOSE: 120 MG/DL (ref 70–110)
POTASSIUM SERPL-SCNC: 3.7 MMOL/L (ref 3.5–5.1)
RBC # BLD AUTO: 3.97 X10(6)/MCL (ref 4–5.1)
SODIUM SERPL-SCNC: 133 MMOL/L (ref 135–145)
WBC # SPEC AUTO: 7.4 X10(3)/MCL (ref 4–11.5)

## 2023-04-21 PROCEDURE — S0030 INJECTION, METRONIDAZOLE: HCPCS | Performed by: FAMILY MEDICINE

## 2023-04-21 PROCEDURE — 85025 COMPLETE CBC W/AUTO DIFF WBC: CPT | Performed by: FAMILY MEDICINE

## 2023-04-21 PROCEDURE — 80048 BASIC METABOLIC PNL TOTAL CA: CPT | Performed by: FAMILY MEDICINE

## 2023-04-21 PROCEDURE — 97116 GAIT TRAINING THERAPY: CPT | Mod: CQ

## 2023-04-21 PROCEDURE — 25000003 PHARM REV CODE 250: Performed by: FAMILY MEDICINE

## 2023-04-21 PROCEDURE — 63600175 PHARM REV CODE 636 W HCPCS: Performed by: FAMILY MEDICINE

## 2023-04-21 PROCEDURE — 94761 N-INVAS EAR/PLS OXIMETRY MLT: CPT

## 2023-04-21 PROCEDURE — 36415 COLL VENOUS BLD VENIPUNCTURE: CPT | Performed by: FAMILY MEDICINE

## 2023-04-21 RX ORDER — METRONIDAZOLE 500 MG/1
500 TABLET ORAL 3 TIMES DAILY
Qty: 15 TABLET | Refills: 0 | Status: SHIPPED | OUTPATIENT
Start: 2023-04-21

## 2023-04-21 RX ORDER — CIPROFLOXACIN 500 MG/1
500 TABLET ORAL EVERY 12 HOURS
Qty: 10 TABLET | Refills: 0 | Status: SHIPPED | OUTPATIENT
Start: 2023-04-21

## 2023-04-21 RX ADMIN — METRONIDAZOLE 500 MG: 5 INJECTION, SOLUTION INTRAVENOUS at 02:04

## 2023-04-21 RX ADMIN — MUPIROCIN: 20 OINTMENT TOPICAL at 09:04

## 2023-04-21 RX ADMIN — LEVOFLOXACIN 500 MG: 500 INJECTION, SOLUTION INTRAVENOUS at 02:04

## 2023-04-21 NOTE — PLAN OF CARE
Problem: Adult Inpatient Plan of Care  Goal: Plan of Care Review  Outcome: Ongoing, Progressing  Goal: Patient-Specific Goal (Individualized)  Outcome: Ongoing, Progressing  Goal: Absence of Hospital-Acquired Illness or Injury  Outcome: Ongoing, Progressing  Goal: Optimal Comfort and Wellbeing  Outcome: Ongoing, Progressing  Goal: Readiness for Transition of Care  Outcome: Ongoing, Progressing     Problem: Infection  Goal: Absence of Infection Signs and Symptoms  Outcome: Ongoing, Progressing     Problem: Hyperglycemia  Goal: Blood Glucose Level Within Targeted Range  Outcome: Ongoing, Progressing     Problem: Diarrhea  Goal: Fluid and Electrolyte Balance  Outcome: Ongoing, Progressing

## 2023-04-21 NOTE — PLAN OF CARE
04/21/23 1128   Final Note   Assessment Type Final Discharge Note   Anticipated Discharge Disposition Home   What phone number can be called within the next 1-3 days to see how you are doing after discharge? [phone]   Post-Acute Status   Discharge Delays None known at this time

## 2023-04-21 NOTE — PHYSICIAN QUERY
PT Name: Laureen Strickland  MR #: [id number]     DOCUMENTATION CLARIFICATION     CDS/: Abisai Vega RN       Contact information: [email or web address]    This form is a permanent document in the medical record.     Query Date: April 21, 2023    By submitting this query, we are merely seeking further clarification of documentation.  Please utilize your independent clinical judgment when addressing the question(s) below.  The Medical Record contains the following:  Indicators Supporting Clinical Findings Location in Medical Record   x HR         RR          BP        Temp Initial Vitals [04/18/23 2158]  BP Pulse Resp Temp SpO2  113/75 99 20 96 °F      98 %    Relative hypotension secondary to hypovolemia due to severe gastroenteritis, continue IV fluids.  Monitor blood pressure closely.     ED Provider (4/18)        HM H&P (4/19)   x Lactic Acid          Procalcitonin Lactate 3.9  Lactate 2.9  Lactate 1.8   Labs (4/18 at 22:48)  Labs (4/19 at 00:55)  Labs (4/19 at 03:10)   x WBC           Bands          CRP WBC 27.6     Severe leukocytosis; likely related to gastroenteritis     Feel better today  Wbc trending down  Less nausea   Labs (4/18)    HM H&P (4/19)    HM PN (4/20)   x Culture(s) Clostridium Difficile GDH Antigen Negative  Clostridium Difficile Toxin A/B Negative   Micro Labs (4/19)   x AMS, Confusion, LOC, etc. She is alert and oriented to person, place, and time. No cranial nerve deficit   ED Provider (4/18)    Organ Dysfunction/Failure     x Bacteremia or Sepsis / Septic Severe sepsis (Primary)   ED Provider (4/18)    Known or Suspected Source of Infection documented      (Failed) Outpatient Treatment     x Medication Severe acute gastroenteritis; rule out infectious induced, place patient on IV fluids, empiric IV Levaquin and Flagyl   HM H&P (4/19)    Treatment     x Other CT of the abdomen was done and shows evidence of gastroenteritis    Syncope episodes; secondary to hypotension due to severe  "gastroenteritis   HM H&P (4/19)     Due to the conflicting clinical picture, please clinically validate the diagnosis of "Severe sepsis."  If validated, please provide additional clinical support for the diagnosis. Please select all that apply:      [  X ] Above stated diagnosis is not confirmed and/or it has been ruled out     [   ] Above stated diagnosis is not confirmed and/or it has been ruled out, other diagnosis ruled in (please          specify):_______________     [   ] Above stated diagnosis is confirmed. Please specify clinical support (signs, symptoms, and treatment) for  the confirmed diagnosis: ____________________     [   ] SIRS without infectious etiology     [   ] SIRS with infection but without Sepsis     [   ] Other clarification (please specify): ___________________       Please document in your progress notes daily for the duration of treatment until resolved and include in your discharge summary.     "

## 2023-04-21 NOTE — PLAN OF CARE
04/21/23 1128   Medicare Message   Important Message from Medicare regarding Discharge Appeal Rights Given to patient/caregiver;Explained to patient/caregiver   Date IMM was signed 04/18/23   Time IMM was signed 0556

## 2023-04-21 NOTE — DISCHARGE SUMMARY
Hospital Medicine  Discharge Summary    Patient Name: Laureen Strickland  MRN: [id number]  Admit Date: 4/18/2023  Discharge Date:    Status: IP- Inpatient   Length of Stay: 2      PHYSICIANS   Admitting Physician: Allen Rivera MD  Discharging Physician: Rick Luu MD.  Primary Care Physician: Slava Carty Iii, MD        DISCHARGE DIAGNOSES     Severe acute gastroenteritis; rule out infectious induced, place patient on IV fluids, empiric IV Levaquin and Flagyl, obtain stool workup.  Obtain COVID-19 testing.  Rule out C diff. CT of the abdomen shows evidence of acute gastroenteritis.  Feel better today  Wbc trending down  Less nausea  PCR pending      Relative hypotension secondary to hypovolemia due to severe gastroenteritis, continue IV fluids.  Monitor blood pressure closely.  Hold off on antihypertensive medications.     Syncope episodes; secondary to hypotension due to severe gastroenteritis, obtain CT of the brain to rule out any intracranial abnormality.  Monitor patient closely.     Severe leukocytosis; likely related to gastroenteritis, rule out C diff, repeat CBC in the morning.  Continue empiric antibiotic therapy.     Type 2 diabetes mellitus; place patient on insulin therapy.  Obtain A1c     Hypertension; blood pressure relatively low, hold off on antihypertensive medication for now.     Hyponatremia; secondary to diarrhea as well as hyperglycemia, continue normal saline.  Monitor sodium level closely.  Obtain TSH level as well.    Metabolic acidosis; secondary to diarrhea as, we will give sodium bicarbonate.  Repeat BMP in the morning.    PROCEDURES         HOSPITAL COURSE     77 y.o. female with a medical history of hypertension and type 2 diabetes mellitus presents to the ER on account of nausea, vomiting and diarrhea since yesterday.    Patient has been at her usual state of health until yesterday when she developed episodes of nausea, nonbloody vomiting and nonbloody and diarrhea.  There  has been associated diffuse nonspecific abdominal pain, pain was about 5/10 in intensity, nonradiating with no aggravating or relieving factor.  There has been associated generalized body weakness, and she had episode of syncope while she was walking after coming out of the bathroom.  She presented to the ER for further evaluation.    At the ER, CT of the abdomen was done and shows evidence of gastroenteritis.  Lactic acid was also elevated.     04/19/2023 feel better this AM   Wbc trend down to 20  Lactic down 1.8     04/20/2023  Feel better today labs improving       STATUS  Improved Stable    DISPOSITION  Discharge to home     DIET  ADA    ACTIVITY  As tolerated      FOLLOW-UP         DISCHARGE MEDICATION RECONCILIATION        Medication List        START taking these medications      ciprofloxacin HCl 500 MG tablet  Commonly known as: CIPRO  Take 1 tablet (500 mg total) by mouth every 12 (twelve) hours.     metroNIDAZOLE 500 MG tablet  Commonly known as: FLAGYL  Take 1 tablet (500 mg total) by mouth 3 (three) times daily.            CONTINUE taking these medications      amLODIPine 10 MG tablet  Commonly known as: NORVASC     cetirizine 10 MG tablet  Commonly known as: ZYRTEC  Take 1 tablet (10 mg total) by mouth once daily.     glimepiride 4 MG tablet  Commonly known as: AMARYL     hydroCHLOROthiazide 12.5 mg capsule  Commonly known as: MICROZIDE     LORazepam 1 MG tablet  Commonly known as: ATIVAN     metFORMIN 500 MG ER 24hr tablet  Commonly known as: GLUCOPHAGE-XR     methylPREDNISolone 4 mg tablet  Commonly known as: MEDROL DOSEPACK  use as directed               Where to Get Your Medications        These medications were sent to [facility] - [place], [place] - [address]  [address], [place] [address]      Phone: [phone]   ciprofloxacin HCl 500 MG tablet  metroNIDAZOLE 500 MG tablet           PHYSICAL EXAM   VITALS: T 99.5 °F (37.5 °C)   BP (!) 147/72   P 70   RR 20   O2 99  %    Physical Exam  Vitals reviewed.   HENT:      Head: Normocephalic and atraumatic.   Cardiovascular:      Rate and Rhythm: Normal rate.   Pulmonary:      Effort: Pulmonary effort is normal.   Abdominal:      Palpations: Abdomen is soft.   Neurological:      Mental Status: She is alert.            Discharge time: 33 minutes     Rick Luu MD  [facility]       DIAGNOSITCS   CBC:   Recent Labs   Lab 04/19/23  0430 04/20/23  0421 04/21/23  0436   WBC 20.4* 10.2 7.4   HGB 11.9 10.9* 10.8*   HCT 35.1* 32.5* 33.2*    174 163     COAG:  No results for input(s): APTT, INR, PTT in the last 168 hours.  CMP:   Recent Labs   Lab 04/18/23  2305 04/19/23 0430 04/20/23 0421 04/21/23  0436   CALCIUM 9.3 7.9* 7.5* 7.7*   ALBUMIN 3.7 3.1* 2.7*  --    * 127* 132* 133*   K 3.3* 3.8 3.4* 3.7   CO2 19* 24 26 27   BUN 12.0 11.0 5.0* 4.0*   CREATININE 0.83 0.73 0.57* 0.52*   ALKPHOS 103 98 77  --    ALT 26 19 14  --    AST 35 26 19  --    BILITOT 0.8 1.0 0.7  --    MG 1.80  --   --   --      Estimated Creatinine Clearance: 75.8 mL/min (A) (based on SCr of 0.52 mg/dL (L)).  CARDIAC ENZYMES:   Recent Labs     04/18/23  2305   TROPONINI 0.017   CPK 45        No results for input(s): AMYLASE, LIPASE in the last 168 hours.      Recent Labs     04/19/23  1025 04/19/23  1541 04/20/23  0717 04/20/23  1110 04/20/23  1552 04/20/23  2105   POCTGLUCOSE 140* 78 120* 157* 119* 175*        Microbiology Results (last 7 days)       Procedure Component Value Units Date/Time    Clostridium Diff Toxin, A & B, EIA [[id number]]  (Normal) Collected: 04/19/23 0930    Order Status: Completed Specimen: Stool Updated: 04/19/23 1427     Clostridium Difficile GDH Antigen Negative     Clostridium Difficile Toxin A/B Negative    C Diff Toxin by PCR [[id number]] Collected: 04/19/23 0930    Order Status: Canceled Specimen: Stool Updated: 04/19/23 0936    Rapid Influenza A/B [[id number]]  (Normal) Collected: 04/18/23 4282    Order Status:  Completed Specimen: Nasopharyngeal from Nasal Updated: 04/18/23 2340     Influenza A Negative     Influenza B Negative               CT Head Without Contrast    Result Date: 4/19/2023  EXAMINATION: STUDY: CT HEAD WITHOUT CONTRAST CLINICAL HISTORY AND TECHNIQUE: Efren Ivey, RT on 4/19/2023  6:31 AM ER PT PROCEDURE: CT BRAIN WO CLINICAL HX: X SEVERAL HRS  LT SIDED WEAKNESS   FACIAL DROOPING  SLURRED SPEECH PMH:SMOKER  HTN  NEUROMAOR SCHWANNOMA CV TECHNIQUE: IV CONTRAST: ORAL CONTRAST: N/A RECTAL CONTRAST: N/A AXIAL IMAGING @ 5 MM INTERVALS W/MULTIPLANAR RECONSTRUCTION OF IMAGES -TOTAL IMAGE NUMBER: 35 -CTDIVOL(MGY) HEAD:104.60   BODY: -DLP (MGYCM) HEAD:1984.40      BODY: TECH: RW This patient has had 4 CT and nuclear medicine scans performed within the last 12 months. The following DOSE REDUCTION TECHNIQUES are used for all CT scans at [facility]: 1. Automated exposure control. 2. Adjustment of the mA and/or kv according to patient size. 3. Use of iterative reconstruction technique. COMPARISON: 04/18/2023 FINDINGS: Axial imaging through the head/brain was performed. Minimal cerebral atrophy accentuates the ventricles and sulci and is most pronounced within the frontal parietal lobes.  Minimal chronic ischemic changes are noted within the deep white matter of both cerebral hemispheres and does not appear excessive for the patient's age.  A left retroauricular electronic device is present as seen on previous imaging.  I see no intraparenchymal masses, hemorrhagic lesions, or dominant wedge-shaped infarcts. I see no extra-axial masses or abnormal fluid collections.     1. Chronic changes are present as described above and as seen previously including minimal cerebral atrophy and chronic ischemic changes which do not appear excessive for the patient's age.  See above comments. Electronically signed by: Jadiel Verduzco Date:    04/19/2023 Time:    08:40    CT Head Without Contrast    Result Date:  4/19/2023  EXAMINATION: CT HEAD WITHOUT CONTRAST CLINICAL HISTORY: syncope; TECHNIQUE: Low dose axial images were obtained through the head.  Coronal and sagittal reformations were also performed. Contrast was not administered. COMPARISON: 05/14/2022 FINDINGS: One agxg-rx-lclc comparison is made to the prior examination, allowing for differences in positioning and technique, no significant or suspicious interval change is appreciated.  Again noted is mild generalized atrophy with mild areas of decreased attenuation involving the periventricular deep white matter on both sides compatible with mild chronic ischemic disease.  A previously seen metallic devices again noted producing some artifact along the left side of the bony calvarium.  No intracranial mass, midline shift or intracranial hemorrhage or evidence of dominant wedge-shaped infarct is identified.     1.   Stable chronic findings having a similar appearance to the prior study of of 05/14/2022. Electronically signed by: Philippe Ya Date:    04/19/2023 Time:    00:13    X-Ray Chest AP Portable    Result Date: 4/19/2023  EXAMINATION: STUDY: XR CHEST AP PORTABLE CLINICAL HISTORY AND TECHNIQUE: Efren Ivey RT on 4/19/2023 12:09 AM CURRENT CLINICAL HISTORY: ER PT X 2 HRS PTA  WEAKNESS   N\V\D PMH:  CV+, MODESTA, HTN, DM, COLON TUMOR, HIATAL HERNIA, GERD, FORMER SMOKER TECHNIQUE: 1V PCXR TECHNOLOGIST: SAMANTHA HOLMAN COMPARISON: 11/13/2017 FINDINGS: The cardiac, hilar, and mediastinal contours appear unremarkable.I see no lobar or segmental infiltrates.No significant pleural effusions are noted.There is moderate demineralization of the skeletal structures with the mild kyphoscoliotic curvature of the thoracic spine and mild degenerative changes noted throughout the thoracic spine.  Atherosclerotic calcifications are noted within the aortic arch.     1. I see no lobar or segmental infiltrates or other significant abnormalities. Electronically signed by: Jadiel  Luba Date:    04/19/2023 Time:    05:05    CT Abdomen Pelvis  Without Contrast    Result Date: 4/19/2023  STUDY:CT SCAN OF THE ABDOMEN AND PELVIS WITH INTRAVENOUS CONTRAST CLINICAL HISTORY & TECHNIQUE: X 2 HRS PTA  WEAKNESS  FALL   N \V\D PMH: VERTIGO  HTN  DM  FORMER SMOKER  COLON TUMOR WITH SX  BOWEL OBSTRUCTION  MODESTA COMPARISON:  01/07/2016 FINDINGS: Liver:  No clinically significant abnormalities noted. Gallbladder/Biliary System:  Compatible with a previous cholecystectomy. Spleen:  No clinically significant abnormalities noted. Adrenal glands:  No clinically significant abnormalities noted. Pancreas:  No clinically significant abnormalities noted. Kidneys/Urinary Tract:  No clinically significant abnormalities noted. Urinary bladder:  No clinically significant abnormalities noted. Prostate gland/uterus and ovaries:  No clinically significant abnormalities noted. GI tract:  Fluid is present in multiple loops of bowel, large and small.  Postsurgical findings are also present. Vascular structures:  Fairly extensive atherosclerotic calcification is present involving the aorta and its major branches. Musculoskeletal structures:  Moderate bony demineralization is present with moderate degenerative findings noted throughout the bony elements. Miscellaneous:  No clinically significant abnormalities noted.     1. Fluid is present in multiple loops of large and small bowel, likely on the basis of gastroenteritis. 2. Postsurgical and chronic findings as described above. n/a CATEGORY: n/a The following dose reduction techniques are used for all CT at [facility]: 1.   Automated exposure control. 2.   Adjustment of the mA and/or kV according to patient size. 3.   Use of iterative reconstruction technique. Electronically signed by: Philippe Ya Date:    04/19/2023 Time:    00:24    Mammo Digital Screening Bilat w/ Tawanda    Result Date: 4/3/2023  Result: Mammo Digital Screening Bilat w/ Tawanda History:  Patient is 77 y.o. and is seen for a screening mammogram. Films Compared: Prior images (if available) were compared. Findings:  This procedure was performed using tomosynthesis. Computer-aided detection was utilized in the interpretation of this examination. The breasts are almost entirely fatty. There is no evidence of suspicious masses, microcalcifications or architectural distortion.      No mammographic evidence of malignancy. BI-RADS Category 1: Negative Recommendation: Routine screening mammogram in 1 year, or as clinically indicated. Your estimated lifetime risk of breast cancer (to age 85) based on Tyrer-Cuzick risk assessment model is 1.81 %.  According to the American [employer] Society, patients with a lifetime breast cancer risk of 20% or higher might benefit from supplemental screening tests. ??

## 2023-07-31 PROBLEM — A41.9 SEVERE SEPSIS: Status: RESOLVED | Noted: 2023-04-21 | Resolved: 2023-07-31

## 2023-07-31 PROBLEM — R65.20 SEVERE SEPSIS: Status: RESOLVED | Noted: 2023-04-21 | Resolved: 2023-07-31

## 2024-03-13 NOTE — PROGRESS NOTES
Chief Complaint: Annual exam    Chief Complaint   Patient presents with    Well Woman     Annual gyn exam       HPI:   78 y.o. F  presents for an annual gyn exam. , denies vaginal bleeding. No current use of HRT.   PMH: DM, HTN      MM2023 negative       FmHx: negative for breast, uterine, ovarian, and colon cancers.     Labs / Significant Studies:  MENOPAUSAL:  Age at menarche: 9  Age at menopause: 58  Hysterectomy:  No  Last pap: 3/22/21 neg  H/o abnl pap: no  Postcoital Bleeding: No  Sexually Active: not currently   Dyspareunia: No  H/o STI: No   HRT: none  MM/3/23 neg  H/o abnl MMG: no  Colonoscopy: x3-4 yrs ago neg per pt       Family History   Problem Relation Age of Onset    Breast cancer Neg Hx     Colon cancer Neg Hx     Ovarian cancer Neg Hx     Uterine cancer Neg Hx     Cervical cancer Neg Hx          Past Medical History:   Diagnosis Date    Benign tumor of colon     Diabetes mellitus     Dizziness     Hypertension     Vertigo      Past Surgical History:   Procedure Laterality Date    APPENDECTOMY      CHOLECYSTECTOMY      COLON SURGERY      HERNIA REPAIR      TONSILLECTOMY AND ADENOIDECTOMY         Current Outpatient Medications:     amLODIPine (NORVASC) 10 MG tablet, Take 10 mg by mouth., Disp: , Rfl:     atorvastatin (LIPITOR) 10 MG tablet, Take 10 mg by mouth every evening., Disp: , Rfl:     glimepiride (AMARYL) 4 MG tablet, Take 4 mg by mouth 2 (two) times daily., Disp: , Rfl:     hydroCHLOROthiazide (MICROZIDE) 12.5 mg capsule, Take 12.5 mg by mouth., Disp: , Rfl:     lisinopriL (PRINIVIL,ZESTRIL) 5 MG tablet, Take 5 mg by mouth every evening., Disp: , Rfl:     metFORMIN (GLUCOPHAGE-XR) 500 MG ER 24hr tablet, SMARTSI Tablet(s) By Mouth Morning-Evening, Disp: , Rfl:     cetirizine (ZYRTEC) 10 MG tablet, Take 1 tablet (10 mg total) by mouth once daily., Disp: 30 tablet, Rfl: 0    ciprofloxacin HCl (CIPRO) 500 MG tablet, Take 1 tablet (500 mg total) by mouth every 12  "(twelve) hours. (Patient not taking: Reported on 3/27/2024), Disp: 10 tablet, Rfl: 0    LORazepam (ATIVAN) 1 MG tablet, Take 1 mg by mouth every 8 (eight) hours as needed., Disp: , Rfl:     metroNIDAZOLE (FLAGYL) 500 MG tablet, Take 1 tablet (500 mg total) by mouth 3 (three) times daily. (Patient not taking: Reported on 3/27/2024), Disp: 15 tablet, Rfl: 0    Review of patient's allergies indicates:   Allergen Reactions    Cephalexin Rash    Sulfa (sulfonamide antibiotics) Rash       Social History     Tobacco Use    Smoking status: Never    Smokeless tobacco: Never   Substance Use Topics    Alcohol use: Never    Drug use: Never       Review of Systems:  General/Constitutional: Chills denies. Fatigue/weakness denies. Fever denies. Night sweats denies. Hot flashes denies    Respiratory: Cough denies. Hemoptysis denies. SOB denies. Sputum production denies. Wheezing denies .   Cardiovascular: Chest pain denies . Dizziness denies. Palpitations denies. Swelling in hands/feet denies    Gastrointestinal: Abdominal pain denies. Blood in stool denies. Constipation denies. Diarrhea denies. Heartburn denies. Nausea denies. Vomiting denies    Genitourinary: Incontinence denies. Blood in urine denies. Frequent urination denies. Painful urination denies. Urinary urgency denies. Nocturia denies    Gynecologic: Irregular menses denies. Heavy bleeding denies. Painful menses denies. Vaginal discharge denies. Vaginal odor denies. Vaginal itching denies. Vaginal lesion denies. Pelvic pain denies. Decreased libido denies. Vulvar lesion denies. Prolapse of genital organs denies. Painful intercourse denies. Postcoital bleeding denies    Psychiatric: Depression denies. Anxiety denies     Physical Exam:   Vitals:    03/27/24 1012   BP: 136/66   BP Location: Right arm   Patient Position: Sitting   BP Method: Medium (Manual)   Temp: 97.5 °F (36.4 °C)   TempSrc: Temporal   Weight: 60.1 kg (132 lb 7.9 oz)   Height: 5' 4" (1.626 m)       Body " mass index is 22.74 kg/m².       Chaperone: present.     General appearance: healthy, well-nourished and well-developed     Psychiatric: Orientation to time, place and person. Normal mood and affect and active, alert     Skin: Appearance: no rashes or lesions.     Neck:   Neck: supple, FROM, trachea midline. and no masses   Thyroid: no enlargement or nodules and non-tender.       Cardiovascular:   Auscultation: RRR and no murmur.   Peripheral Vascular: no varicosities, LLE edema, RLE edema, calf tenderness, and palpable cord and pedal pulses intact.     Lungs:   Respiratory effort: no intercostal retractions or accessory muscle usage.   Auscultation: no wheezing, rales/crackles, or rhonchi and clear to auscultation.     Breast:   Inspection/Palpation: no tenderness, discrete/distinct masses, skin changes, or abnormal secretions. Nipple appearance normal.     Abdomen:   Auscultation/Inspection/Palpation: no hepatomegaly, splenomegaly, masses, tenderness or CVA tenderness and soft, non-distended bowel sounds preset.    Hernia: no palpable hernias.     Female Genitalia:    Vulva: no masses, tenderness or lesions    Bladder/Urethra: no urethral discharge or mass, normal meatus, bladder non-distended.    Vagina: no tenderness, erythema, cystocele, rectocele, abnormal vaginal discharge or vesicle(s) or ulcers, atrophic    Cervix: no discharge, no cervical lacerations noted or motion tenderness and grossly normal    Uterus: normal size and shape and midline, non-tender, and no uterine prolapse.    Adnexa/Parametria: no parametrial tenderness or mass, no adnexal tenderness or ovarian mass.     Lymph Nodes:   Palpation: non tender submandibular nodes, axillary nodes, or inguinal nodes.     Rectal Exam:   Rectum: normal perianal skin.       Assessment:     Patient Active Problem List   Diagnosis   (none) - all problems resolved or deleted       Health Maintenance Due   Topic Date Due    TETANUS VACCINE  Never done    DEXA  Scan  Never done    RSV Vaccine (Age 60+ and Pregnant patients) (1 - 1-dose 60+ series) Never done    Pneumococcal Vaccines (Age 65+) (1 of 1 - PCV) Never done    Shingles Vaccine (2 of 3) 01/03/2014    COVID-19 Vaccine (4 - 2023-24 season) 09/01/2023     Health Maintenance Topics with due status: Not Due       Topic Last Completion Date    Lipid Panel 04/21/2021         Plan:    Laureen was seen today for well woman.    Diagnoses and all orders for this visit:    Abnormal gynecological examination  PAP   Reviewed calcium needs, exercise, and prevention of osteoporosis.  Healthy diet  Annual MMG  Discussed breast self-awareness  Colonoscopy q 10 yrs  Reviewed normal menopause and menopausal symptoms  RTC 1 yr   Encounter for screening mammogram for breast cancer  -     Mammo Digital Screening Bilat w/ Tawanda; Future    Atrophic vaginitis    - Educated     - Lubricants, coconut oil, baby oil

## 2024-03-27 ENCOUNTER — OFFICE VISIT (OUTPATIENT)
Dept: OBSTETRICS AND GYNECOLOGY | Facility: CLINIC | Age: 79
End: 2024-03-27
Payer: MEDICARE

## 2024-03-27 VITALS
DIASTOLIC BLOOD PRESSURE: 66 MMHG | BODY MASS INDEX: 22.62 KG/M2 | SYSTOLIC BLOOD PRESSURE: 136 MMHG | WEIGHT: 132.5 LBS | TEMPERATURE: 98 F | HEIGHT: 64 IN

## 2024-03-27 DIAGNOSIS — N95.2 ATROPHIC VAGINITIS: ICD-10-CM

## 2024-03-27 DIAGNOSIS — Z01.411 ABNORMAL GYNECOLOGICAL EXAMINATION: ICD-10-CM

## 2024-03-27 DIAGNOSIS — Z12.4 SCREENING FOR MALIGNANT NEOPLASM OF THE CERVIX: Primary | ICD-10-CM

## 2024-03-27 DIAGNOSIS — Z12.31 ENCOUNTER FOR SCREENING MAMMOGRAM FOR BREAST CANCER: ICD-10-CM

## 2024-03-27 PROCEDURE — G0101 CA SCREEN;PELVIC/BREAST EXAM: HCPCS | Mod: ,,, | Performed by: NURSE PRACTITIONER

## 2024-03-27 PROCEDURE — 87624 HPV HI-RISK TYP POOLED RSLT: CPT | Performed by: NURSE PRACTITIONER

## 2024-03-27 RX ORDER — ATORVASTATIN CALCIUM 10 MG/1
10 TABLET, FILM COATED ORAL NIGHTLY
COMMUNITY
Start: 2024-03-11

## 2024-03-27 RX ORDER — LISINOPRIL 5 MG/1
5 TABLET ORAL NIGHTLY
COMMUNITY

## 2024-04-02 LAB
HIGH RISK HPV: NEGATIVE
PSYCHE PATHOLOGY RESULT: NORMAL

## 2024-04-02 NOTE — PROGRESS NOTES
Hey,  Your pap smear negative for intraepithelial lesion or malignancy, which means it is normal and negative for cancer.  The HPV testing that we do on pap smears is also negative.  We will  repeat your pap smear in 1 year.  Call the office if you have any problems.  Enjoy your day!    Rhianna

## 2024-04-09 ENCOUNTER — HOSPITAL ENCOUNTER (OUTPATIENT)
Dept: RADIOLOGY | Facility: HOSPITAL | Age: 79
Discharge: HOME | End: 2024-04-09
Attending: NURSE PRACTITIONER
Payer: MEDICARE

## 2024-04-09 DIAGNOSIS — Z12.31 ENCOUNTER FOR SCREENING MAMMOGRAM FOR BREAST CANCER: ICD-10-CM

## 2024-04-09 PROCEDURE — 77063 BREAST TOMOSYNTHESIS BI: CPT | Mod: TC

## 2024-04-12 ENCOUNTER — HOSPITAL ENCOUNTER (EMERGENCY)
Facility: HOSPITAL | Age: 79
Discharge: HOME | End: 2024-04-12
Attending: EMERGENCY MEDICINE
Payer: MEDICARE

## 2024-04-12 VITALS
DIASTOLIC BLOOD PRESSURE: 68 MMHG | TEMPERATURE: 98 F | OXYGEN SATURATION: 96 % | RESPIRATION RATE: 18 BRPM | HEART RATE: 74 BPM | WEIGHT: 123.5 LBS | SYSTOLIC BLOOD PRESSURE: 121 MMHG | BODY MASS INDEX: 21.2 KG/M2

## 2024-04-12 DIAGNOSIS — S80.02XA CONTUSION OF LEFT KNEE, INITIAL ENCOUNTER: Primary | ICD-10-CM

## 2024-04-12 DIAGNOSIS — S93.402A SPRAIN OF LEFT ANKLE, UNSPECIFIED LIGAMENT, INITIAL ENCOUNTER: ICD-10-CM
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
## 2024-04-12 DIAGNOSIS — M25.569 KNEE PAIN: ICD-10-CM

## 2024-04-12 PROCEDURE — 25000003 PHARM REV CODE 250: Performed by: NURSE PRACTITIONER

## 2024-04-12 PROCEDURE — 99283 EMERGENCY DEPT VISIT LOW MDM: CPT | Mod: 25

## 2024-04-12 RX ORDER — IBUPROFEN 400 MG/1
400 TABLET ORAL
Status: COMPLETED | OUTPATIENT
Start: 2024-04-12 | End: 2024-04-12

## 2024-04-12 RX ORDER — NAPROXEN 500 MG/1
500 TABLET ORAL 2 TIMES DAILY
Qty: 30 TABLET | Refills: 0 | Status: SHIPPED | OUTPATIENT
Start: 2024-04-12 | End: 2024-04-27

## 2024-04-12 RX ORDER — NAPROXEN 500 MG/1
500 TABLET ORAL 2 TIMES DAILY
Qty: 30 TABLET | Refills: 0 | Status: SHIPPED | OUTPATIENT
Start: 2024-04-12 | End: 2024-04-12

## 2024-04-12 RX ADMIN — IBUPROFEN 400 MG: 400 TABLET, FILM COATED ORAL at 04:04

## 2024-04-12 NOTE — ED PROVIDER NOTES
Encounter Date: 4/12/2024       History     Chief Complaint   Patient presents with    Fall     Pt states she fell from 3 steps up on a ladder on to her left leg.  Left leg pain from knee to ankle, unable to bear weight.     Patient is an 78-year-old female presents emerged department with complaints of left knee leg ankle pain.  States she was on a step stool and thought she was on the bottom step but missed a step falling straight down landing on the foot jarring her knee and leg.  States pain is from the knee all the way down into the ankle.  Denies any foot pain.  Small abrasion noted to the anterior left lower leg but no major deformity swelling.  Pulses cap refill sensation range of motion intact distal to the area pain.  Denies any other complaints associated symptoms at this time.      Review of patient's allergies indicates:   Allergen Reactions    Cephalexin Rash    Sulfa (sulfonamide antibiotics) Rash     Past Medical History:   Diagnosis Date    Benign tumor of colon     Diabetes mellitus     Dizziness     Hypertension     Vertigo      Past Surgical History:   Procedure Laterality Date    APPENDECTOMY      CHOLECYSTECTOMY      COLON SURGERY      HERNIA REPAIR      TONSILLECTOMY AND ADENOIDECTOMY       Family History   Problem Relation Age of Onset    Breast cancer Neg Hx     Colon cancer Neg Hx     Ovarian cancer Neg Hx     Uterine cancer Neg Hx     Cervical cancer Neg Hx      Social History     Tobacco Use    Smoking status: Never    Smokeless tobacco: Never   Substance Use Topics    Alcohol use: Never    Drug use: Never     Review of Systems   Constitutional:  Negative for activity change, appetite change and fever.   HENT:  Negative for congestion, dental problem and sore throat.    Eyes:  Negative for discharge and itching.   Respiratory:  Negative for apnea, chest tightness and shortness of breath.    Cardiovascular:  Negative for chest pain.   Gastrointestinal:  Negative for abdominal distention,  abdominal pain and nausea.   Genitourinary:  Negative for dysuria, vaginal bleeding, vaginal discharge and vaginal pain.   Musculoskeletal:  Positive for arthralgias, joint swelling and myalgias. Negative for back pain.   Skin:  Negative for rash.   Neurological:  Negative for dizziness, facial asymmetry and weakness.   Hematological:  Does not bruise/bleed easily.   Psychiatric/Behavioral:  Negative for agitation and behavioral problems.    All other systems reviewed and are negative.      Physical Exam     Initial Vitals [04/12/24 1450]   BP Pulse Resp Temp SpO2   129/72 83 18 97.9 °F (36.6 °C) 98 %      MAP       --         Physical Exam    Nursing note and vitals reviewed.  Constitutional: Vital signs are normal. She appears well-developed and well-nourished.  Non-toxic appearance. She does not have a sickly appearance.   HENT:   Head: Normocephalic and atraumatic.   Right Ear: External ear normal.   Left Ear: External ear normal.   Eyes: Conjunctivae, EOM and lids are normal. Lids are everted and swept, no foreign bodies found.   Neck: Trachea normal and phonation normal. Neck supple. No thyroid mass and no thyromegaly present.   Normal range of motion.   Full passive range of motion without pain.     Cardiovascular:  Normal rate, regular rhythm, S1 normal, S2 normal, normal heart sounds, intact distal pulses and normal pulses.           Pulmonary/Chest: Breath sounds normal.   Abdominal: Abdomen is soft. There is no abdominal tenderness.   Musculoskeletal:         General: Tenderness and edema present.      Cervical back: Full passive range of motion without pain, normal range of motion and neck supple.      Comments: Painful range of motion of the knee.  Full range of motion of the ankle but it still does have some pain.  No deformity to the ankle.  Small abrasion to the left anterior knee.       Lymphadenopathy:     She has no cervical adenopathy.   Neurological: She is alert and oriented to person, place,  and time. She has normal strength. GCS score is 15. GCS eye subscore is 4. GCS verbal subscore is 5. GCS motor subscore is 6.   Skin: Skin is warm, dry and intact. Capillary refill takes less than 2 seconds.   Psychiatric: She has a normal mood and affect. Her speech is normal and behavior is normal. Judgment normal. Cognition and memory are normal.         ED Course   Procedures  Labs Reviewed - No data to display       Imaging Results              X-Ray Knee 3 View Left (Preliminary result)  Result time 04/12/24 16:59:58      Wet Read by Kelechi Guzman FNP (04/12/24 16:59:58, [facility] - Emergency Dept, Emergency Medicine)    Wet read:  No obvious acute bony abnormality seen on plain film x-ray of the knee.  Pending radiologist's review.                                     X-Ray Ankle Complete Left (Preliminary result)  Result time 04/12/24 17:00:17      Wet Read by Kelechi Guzman FNP (04/12/24 17:00:17, [facility] - Emergency Dept, Emergency Medicine)    Wet read:  No obvious acute bony abnormality seen on plain film x-ray of the ankle.  Pending radiologist's review.                                     Medications   ibuprofen tablet 400 mg (400 mg Oral Given 4/12/24 1606)     Medical Decision Making  Patient is an 78-year-old female presents emerged department with complaints of left knee leg ankle pain.  States she was on a step stool and thought she was on the bottom step but missed a step falling straight down landing on the foot jarring her knee and leg.  States pain is from the knee all the way down into the ankle.  Denies any foot pain.  Small abrasion noted to the anterior left lower leg but no major deformity swelling.  Pulses cap refill sensation range of motion intact distal to the area pain.  Denies any other complaints associated symptoms at this time.    Problems Addressed:  Knee pain: acute illness or injury     Details: Patient had x-rays here show no acute bony  abnormality.  Waiting official interpretation by the radiologist.  Will send home with Ace wrap crutches for support.  Discussed icing elevation and anti-inflammatories will prescribe naproxen.  Strict ED return precautions discussed for any changing worsening symptoms.    Amount and/or Complexity of Data Reviewed  External Data Reviewed: labs, radiology and notes.  Radiology: ordered and independent interpretation performed.    Risk  Prescription drug management.                                      Clinical Impression:  Final diagnoses:  [M25.569] Knee pain  [W19.XXXA] Fall  [S80.02XA] Contusion of left knee, initial encounter (Primary)  [S93.402A] Sprain of left ankle, unspecified ligament, initial encounter          ED Disposition Condition    Discharge Stable          ED Prescriptions       Medication Sig Dispense Start Date End Date Auth. Provider    naproxen (NAPROSYN) 500 MG tablet Take 1 tablet (500 mg total) by mouth 2 (two) times daily. for 15 days 30 tablet 4/12/2024 4/27/2024 Kelechi Guzman FNP          Follow-up Information       Follow up With Specialties Details Why Contact Info    Slava Carty III, MD Family Medicine Schedule an appointment as soon as possible for a visit in 3 days For ER Follow Up. [address] [id number]  [phone]               Kelechi Guzman FNP  04/12/24 7077

## 2024-04-16 NOTE — PROGRESS NOTES
Hey,  Your mammogram  is negative.  We will repeat  it in 1 year or as recommended.  If you should note changes such as a mass, nipple discharge or nipple retraction, dimpling or puckering of the skin, you should return to clinic for evaluation.  Have a great day!  Rhianna

## 2024-04-24 ENCOUNTER — HOSPITAL ENCOUNTER (OUTPATIENT)
Dept: RADIOLOGY | Facility: HOSPITAL | Age: 79
Discharge: HOME | End: 2024-04-24
Attending: FAMILY MEDICINE
Payer: MEDICARE

## 2024-04-24 DIAGNOSIS — M79.605 LEFT LEG PAIN: ICD-10-CM

## 2024-04-24 PROCEDURE — 73590 X-RAY EXAM OF LOWER LEG: CPT | Mod: TC,LT

## 2024-07-10 ENCOUNTER — DOCUMENT SCAN (OUTPATIENT)
Dept: HOME HEALTH SERVICES | Facility: HOSPITAL | Age: 79
End: 2024-07-10
Payer: MEDICARE

## 2025-02-27 ENCOUNTER — TELEPHONE (OUTPATIENT)
Dept: OBSTETRICS AND GYNECOLOGY | Facility: CLINIC | Age: 80
End: 2025-02-27
Payer: MEDICARE

## 2025-02-27 DIAGNOSIS — Z12.31 SCREENING MAMMOGRAM FOR BREAST CANCER: Primary | ICD-10-CM

## 2025-02-27 NOTE — TELEPHONE ENCOUNTER
----- Message from Brylee sent at 2/27/2025  9:42 AM CST -----  Regarding: Order  Contact: Laureen  Type:  MammogramCaller is requesting to schedule their annual mammogram appointment.  Order is not listed in EPIC.  Please enter order and contact patient to schedule.Name of Caller:Kathryn would they like the mammogram performed?OALHWould the patient rather a call back or a response via [facility]? Call [facility] Call Back Number:[phone] Additional Information:

## 2025-04-17 ENCOUNTER — HOSPITAL ENCOUNTER (OUTPATIENT)
Dept: RADIOLOGY | Facility: HOSPITAL | Age: 80
Discharge: HOME | End: 2025-04-17
Attending: NURSE PRACTITIONER
Payer: MEDICARE

## 2025-04-17 DIAGNOSIS — Z12.31 SCREENING MAMMOGRAM FOR BREAST CANCER: ICD-10-CM

## 2025-04-17 PROCEDURE — 77067 SCR MAMMO BI INCL CAD: CPT | Mod: TC

## 2025-04-21 ENCOUNTER — RESULTS FOLLOW-UP (OUTPATIENT)
Dept: OBSTETRICS AND GYNECOLOGY | Facility: CLINIC | Age: 80
End: 2025-04-21